# Patient Record
Sex: MALE | Race: WHITE | NOT HISPANIC OR LATINO | URBAN - METROPOLITAN AREA
[De-identification: names, ages, dates, MRNs, and addresses within clinical notes are randomized per-mention and may not be internally consistent; named-entity substitution may affect disease eponyms.]

---

## 2017-03-09 RX ORDER — RANITIDINE 150 MG/1
150 TABLET ORAL 2 TIMES DAILY
Qty: 90 TABLET | Refills: 1 | Status: SHIPPED | OUTPATIENT
Start: 2017-03-09 | End: 2017-05-08 | Stop reason: SDUPTHER

## 2017-03-27 ENCOUNTER — OFFICE VISIT (OUTPATIENT)
Dept: INTERNAL MEDICINE | Facility: CLINIC | Age: 57
End: 2017-03-27

## 2017-03-27 VITALS
TEMPERATURE: 97.7 F | OXYGEN SATURATION: 97 % | HEART RATE: 58 BPM | WEIGHT: 166 LBS | RESPIRATION RATE: 16 BRPM | DIASTOLIC BLOOD PRESSURE: 72 MMHG | BODY MASS INDEX: 24.59 KG/M2 | SYSTOLIC BLOOD PRESSURE: 108 MMHG | HEIGHT: 69 IN

## 2017-03-27 DIAGNOSIS — E78.5 HYPERLIPIDEMIA, UNSPECIFIED HYPERLIPIDEMIA TYPE: Primary | ICD-10-CM

## 2017-03-27 DIAGNOSIS — E03.9 HYPOTHYROIDISM, UNSPECIFIED TYPE: ICD-10-CM

## 2017-03-27 DIAGNOSIS — K21.9 GASTROESOPHAGEAL REFLUX DISEASE WITHOUT ESOPHAGITIS: ICD-10-CM

## 2017-03-27 LAB
CHOLEST SERPL-MCNC: 190 MG/DL (ref 0–200)
HDLC SERPL-MCNC: 105 MG/DL (ref 40–60)
LDLC SERPL CALC-MCNC: 75 MG/DL (ref 0–100)
TRIGL SERPL-MCNC: 51 MG/DL (ref 0–150)
TSH SERPL DL<=0.005 MIU/L-ACNC: 0.33 MIU/ML (ref 0.27–4.2)
VLDLC SERPL CALC-MCNC: 10.2 MG/DL (ref 5–40)

## 2017-03-27 PROCEDURE — 99214 OFFICE O/P EST MOD 30 MIN: CPT | Performed by: INTERNAL MEDICINE

## 2017-03-27 NOTE — PROGRESS NOTES
Subjective   aWqas Odonnell is a 56 y.o. male.     Chief Complaint   Patient presents with   • Hyperlipidemia     follow up GERD   • Hypothyroidism         Hyperlipidemia   This is a chronic problem. The current episode started more than 1 year ago. The problem is controlled. Recent lipid tests were reviewed and are normal. Exacerbating diseases include hypothyroidism. There are no known factors aggravating his hyperlipidemia. Pertinent negatives include no chest pain or shortness of breath. Current antihyperlipidemic treatment includes statins. The current treatment provides significant improvement of lipids. There are no compliance problems.  Risk factors for coronary artery disease include dyslipidemia.   Hypothyroidism   This is a chronic problem. The current episode started more than 1 year ago. The problem occurs constantly. The problem has been unchanged. Associated symptoms include nausea. Pertinent negatives include no abdominal pain, chest pain, chills, fever or vomiting. Nothing aggravates the symptoms. The treatment provided significant relief.   Heartburn   He complains of heartburn and nausea. He reports no abdominal pain, no chest pain or no wheezing. This is a chronic problem. The current episode started more than 1 year ago. The problem occurs occasionally. The problem has been unchanged. The heartburn is of mild intensity. The symptoms are aggravated by certain foods and caffeine. Risk factors include caffeine use. He has tried a histamine-2 antagonist for the symptoms. The treatment provided significant relief.        The following portions of the patient's history were reviewed and updated as appropriate: allergies, current medications, past social history and problem list.    Outpatient Prescriptions Marked as Taking for the 3/27/17 encounter (Office Visit) with Brian Boo MD   Medication Sig Dispense Refill   • aspirin 81 MG EC tablet Take 81 mg by mouth Daily.     • levothyroxine  (SYNTHROID) 175 MCG tablet Take 1 tablet by mouth Daily. 90 tablet 3   • LIVALO 4 MG tablet Take 1 tablet by mouth Daily. 90 tablet 1   • raNITIdine (ZANTAC) 150 MG tablet Take 1 tablet by mouth 2 (Two) Times a Day. 90 tablet 1       Review of Systems   Constitutional: Negative for chills and fever.   Respiratory: Negative for shortness of breath and wheezing.    Cardiovascular: Negative for chest pain and palpitations.   Gastrointestinal: Positive for heartburn and nausea. Negative for abdominal distention, abdominal pain, constipation, diarrhea and vomiting.       Objective   Vitals:    03/27/17 1526   BP: 108/72   Pulse: 58   Resp: 16   Temp: 97.7 °F (36.5 °C)   SpO2: 97%      Last Weight    03/27/17  1526   Weight: 166 lb (75.3 kg)    [unfilled]  Body mass index is 24.51 kg/(m^2).      Physical Exam   Constitutional: He appears well-developed and well-nourished. No distress.   HENT:   Head: Normocephalic and atraumatic.   Neck: Carotid bruit is not present. No thyromegaly present.   Cardiovascular: Normal rate, regular rhythm, normal heart sounds and intact distal pulses.  Exam reveals no gallop.    No murmur heard.  Pulmonary/Chest: Effort normal and breath sounds normal. No respiratory distress. He has no wheezes. He has no rales.   Abdominal: Soft. Bowel sounds are normal. He exhibits no mass. There is no tenderness. There is no guarding.         Problem List Items Addressed This Visit        Cardiovascular and Mediastinum    Hyperlipidemia - Primary    Relevant Orders    Lipid Panel       Digestive    Gastroesophageal reflux disease without esophagitis       Endocrine    Hypothyroidism    Relevant Orders    TSH        Assessment/Plan   In for recheck of hyperlipidemia, GERD, and hypothyroidism.  His GERD is under good control.  Thyroid is under good control.  Lipids under treatment with a statin.  He gets a lipid profile every 6 months.  Annual lab work and annual exam September 2017.  Recheck TSH today  since we cut back on his thyroid last visit.         Pedro disclaimer:   Much of this encounter note is an electronic transcription/translation of spoken language to printed text. The electronic translation of spoken language may permit erroneous, or at times, nonsensical words or phrases to be inadvertently transcribed; Although I have reviewed the note for such errors, some may still exist.

## 2017-05-08 RX ORDER — RANITIDINE 150 MG/1
150 TABLET ORAL 2 TIMES DAILY
Qty: 90 TABLET | Refills: 1 | Status: SHIPPED | OUTPATIENT
Start: 2017-05-08 | End: 2017-08-16 | Stop reason: SDUPTHER

## 2017-05-22 RX ORDER — RANITIDINE 150 MG/1
TABLET ORAL
Qty: 90 TABLET | Refills: 0 | Status: SHIPPED | OUTPATIENT
Start: 2017-05-22 | End: 2018-04-03 | Stop reason: SDUPTHER

## 2017-07-19 RX ORDER — PITAVASTATIN CALCIUM 4.18 MG/1
TABLET, FILM COATED ORAL
Qty: 90 TABLET | Refills: 1 | Status: SHIPPED | OUTPATIENT
Start: 2017-07-19 | End: 2017-10-03

## 2017-08-16 RX ORDER — RANITIDINE 150 MG/1
TABLET ORAL
Qty: 90 TABLET | Refills: 1 | Status: SHIPPED | OUTPATIENT
Start: 2017-08-16 | End: 2017-10-03

## 2017-09-27 RX ORDER — LEVOTHYROXINE SODIUM 175 MCG
TABLET ORAL
Qty: 90 TABLET | Refills: 0 | Status: SHIPPED | OUTPATIENT
Start: 2017-09-27 | End: 2017-12-26 | Stop reason: SDUPTHER

## 2017-10-03 ENCOUNTER — OFFICE VISIT (OUTPATIENT)
Dept: INTERNAL MEDICINE | Facility: CLINIC | Age: 57
End: 2017-10-03

## 2017-10-03 VITALS
HEIGHT: 69 IN | HEART RATE: 96 BPM | BODY MASS INDEX: 25.33 KG/M2 | WEIGHT: 171 LBS | TEMPERATURE: 98.5 F | OXYGEN SATURATION: 99 % | SYSTOLIC BLOOD PRESSURE: 132 MMHG | DIASTOLIC BLOOD PRESSURE: 72 MMHG | RESPIRATION RATE: 16 BRPM

## 2017-10-03 DIAGNOSIS — Z12.5 SCREENING FOR PROSTATE CANCER: ICD-10-CM

## 2017-10-03 DIAGNOSIS — Z00.00 ENCOUNTER FOR ANNUAL HEALTH EXAMINATION: Primary | ICD-10-CM

## 2017-10-03 DIAGNOSIS — E78.5 HYPERLIPIDEMIA, UNSPECIFIED HYPERLIPIDEMIA TYPE: ICD-10-CM

## 2017-10-03 DIAGNOSIS — E03.9 HYPOTHYROIDISM, UNSPECIFIED TYPE: ICD-10-CM

## 2017-10-03 LAB
ALBUMIN SERPL-MCNC: 5 G/DL (ref 3.5–5.2)
ALBUMIN/GLOB SERPL: 2.5 G/DL
ALP SERPL-CCNC: 50 U/L (ref 39–117)
ALT SERPL-CCNC: 18 U/L (ref 1–41)
AST SERPL-CCNC: 19 U/L (ref 1–40)
BASOPHILS # BLD AUTO: 0.02 10*3/MM3 (ref 0–0.2)
BASOPHILS NFR BLD AUTO: 0.3 % (ref 0–1.5)
BILIRUB BLD-MCNC: NEGATIVE MG/DL
BILIRUB SERPL-MCNC: 1 MG/DL (ref 0.1–1.2)
BUN SERPL-MCNC: 16 MG/DL (ref 6–20)
BUN/CREAT SERPL: 17.6 (ref 7–25)
CALCIUM SERPL-MCNC: 9.8 MG/DL (ref 8.6–10.5)
CHLORIDE SERPL-SCNC: 101 MMOL/L (ref 98–107)
CHOLEST SERPL-MCNC: 206 MG/DL (ref 0–200)
CLARITY, POC: CLEAR
CO2 SERPL-SCNC: 23.5 MMOL/L (ref 22–29)
COLOR UR: YELLOW
CREAT SERPL-MCNC: 0.91 MG/DL (ref 0.76–1.27)
EOSINOPHIL # BLD AUTO: 0.22 10*3/MM3 (ref 0–0.7)
EOSINOPHIL NFR BLD AUTO: 3.3 % (ref 0.3–6.2)
ERYTHROCYTE [DISTWIDTH] IN BLOOD BY AUTOMATED COUNT: 12.9 % (ref 11.5–14.5)
GLOBULIN SER CALC-MCNC: 2 GM/DL
GLUCOSE SERPL-MCNC: 96 MG/DL (ref 65–99)
GLUCOSE UR STRIP-MCNC: NEGATIVE MG/DL
HCT VFR BLD AUTO: 44.2 % (ref 40.4–52.2)
HDLC SERPL-MCNC: 104 MG/DL (ref 40–60)
HGB BLD-MCNC: 15 G/DL (ref 13.7–17.6)
IMM GRANULOCYTES # BLD: 0 10*3/MM3 (ref 0–0.03)
IMM GRANULOCYTES NFR BLD: 0 % (ref 0–0.5)
KETONES UR QL: NEGATIVE
LDLC SERPL CALC-MCNC: 90 MG/DL (ref 0–100)
LEUKOCYTE EST, POC: NEGATIVE
LYMPHOCYTES # BLD AUTO: 2.38 10*3/MM3 (ref 0.9–4.8)
LYMPHOCYTES NFR BLD AUTO: 35.7 % (ref 19.6–45.3)
MCH RBC QN AUTO: 32.1 PG (ref 27–32.7)
MCHC RBC AUTO-ENTMCNC: 33.9 G/DL (ref 32.6–36.4)
MCV RBC AUTO: 94.4 FL (ref 79.8–96.2)
MONOCYTES # BLD AUTO: 0.7 10*3/MM3 (ref 0.2–1.2)
MONOCYTES NFR BLD AUTO: 10.5 % (ref 5–12)
NEUTROPHILS # BLD AUTO: 3.34 10*3/MM3 (ref 1.9–8.1)
NEUTROPHILS NFR BLD AUTO: 50.2 % (ref 42.7–76)
NITRITE UR-MCNC: NEGATIVE MG/ML
PH UR: 5.5 [PH] (ref 5–8)
PLATELET # BLD AUTO: 218 10*3/MM3 (ref 140–500)
POTASSIUM SERPL-SCNC: 4.2 MMOL/L (ref 3.5–5.2)
PROT SERPL-MCNC: 7 G/DL (ref 6–8.5)
PROT UR STRIP-MCNC: NEGATIVE MG/DL
PSA SERPL-MCNC: 0.36 NG/ML (ref 0–4)
RBC # BLD AUTO: 4.68 10*6/MM3 (ref 4.6–6)
RBC # UR STRIP: NEGATIVE /UL
SODIUM SERPL-SCNC: 139 MMOL/L (ref 136–145)
SP GR UR: 1.01 (ref 1–1.03)
TRIGL SERPL-MCNC: 60 MG/DL (ref 0–150)
TSH SERPL DL<=0.005 MIU/L-ACNC: 1.37 MIU/ML (ref 0.27–4.2)
UROBILINOGEN UR QL: NORMAL
VLDLC SERPL CALC-MCNC: 12 MG/DL (ref 5–40)
WBC # BLD AUTO: 6.66 10*3/MM3 (ref 4.5–10.7)

## 2017-10-03 PROCEDURE — 99396 PREV VISIT EST AGE 40-64: CPT | Performed by: INTERNAL MEDICINE

## 2017-10-03 PROCEDURE — 81003 URINALYSIS AUTO W/O SCOPE: CPT | Performed by: INTERNAL MEDICINE

## 2017-10-03 NOTE — PROGRESS NOTES
Subjective   Waqas Odonnell is a 57 y.o. male.     Chief Complaint   Patient presents with   • Annual Exam         HPI Comments: In for annual preventative exam.  He gets about 4-5 hours of sleep at night.  Feels rested.  Energy is okay.  Is been high strung.  No significant exercise other than the being very active at work.  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  Diet is balanced.  Works in the [employer].       The following portions of the patient's history were reviewed and updated as appropriate: allergies, current medications, past social history and problem list.    HISTORY  Outpatient Prescriptions Marked as Taking for the 10/3/17 encounter (Office Visit) with Brian Boo MD   Medication Sig Dispense Refill   • aspirin 81 MG EC tablet Take 81 mg by mouth Daily.     • LIVALO 4 MG tablet Take 1 tablet by mouth Daily. 90 tablet 1   • raNITIdine (ZANTAC) 150 MG tablet TAKE 1 TABLET BY MOUTH 2 (TWO) TIMES A DAY. 90 tablet 0   • SYNTHROID 175 MCG tablet TAKE 1 TABLET DAILY 90 tablet 0     Social History     Social History   • Marital status:      Spouse name: N/A   • Number of children: N/A   • Years of education: N/A     Occupational History   • Not on file.     Social History Main Topics   • Smoking status: Former Smoker     Quit date: 1991   • Smokeless tobacco: Not on file   • Alcohol use Yes      Comment: Beer: Weekends   • Drug use: Not on file   • Sexual activity: Not on file     Other Topics Concern   • Not on file     Social History Narrative     Family History   Problem Relation Age of Onset   • Diabetes Mother    • Heart attack Father    • No Known Problems Sister    • No Known Problems Brother    • No Known Problems Daughter    • No Known Problems Son    • No Known Problems Brother      No past medical history on file.  No past surgical history on file.    Review of Systems   Constitutional: Negative for chills, fatigue and fever.   HENT: Negative for  congestion, ear pain, nosebleeds, rhinorrhea, sore throat and voice change.    Eyes: Negative for discharge, itching and visual disturbance.   Respiratory: Negative for cough, chest tightness, shortness of breath and wheezing.    Cardiovascular: Negative for chest pain, palpitations and leg swelling.   Gastrointestinal: Positive for abdominal pain (heartburn controlled with Zantac) and blood in stool (occasional few drops of blood rectum after bowel movement). Negative for anal bleeding, constipation, diarrhea, nausea and vomiting.   Endocrine: Negative for cold intolerance, heat intolerance and polyuria.   Genitourinary: Negative for difficulty urinating, dysuria, frequency, hematuria and urgency.   Musculoskeletal: Negative for arthralgias, back pain and myalgias.   Skin: Negative for rash and wound.   Allergic/Immunologic: Negative for environmental allergies.   Neurological: Negative for dizziness, syncope, weakness, light-headedness and headaches.   Hematological: Negative for adenopathy. Does not bruise/bleed easily.   Psychiatric/Behavioral: Negative for confusion and sleep disturbance. The patient is not nervous/anxious.        Objective   Vitals:    10/03/17 1442   BP: 132/72   Pulse: 96   Resp: 16   Temp: 98.5 °F (36.9 °C)   SpO2: 99%      Last Weight    10/03/17  1442   Weight: 171 lb (77.6 kg)    [unfilled]  Body mass index is 25.25 kg/(m^2).      Physical Exam   Constitutional: He is oriented to person, place, and time. He appears well-developed and well-nourished.   HENT:   Head: Normocephalic and atraumatic.   Right Ear: External ear normal.   Left Ear: External ear normal.   Nose: Nose normal.   Mouth/Throat: Oropharynx is clear and moist.   Eyes: Conjunctivae and EOM are normal. Pupils are equal, round, and reactive to light. No scleral icterus.   Neck: Normal range of motion. Neck supple. No JVD present. No thyromegaly present.   Cardiovascular: Normal rate, regular rhythm, normal heart sounds  and intact distal pulses.  Exam reveals no gallop and no friction rub.    No murmur heard.  Pulmonary/Chest: Effort normal and breath sounds normal. No respiratory distress. He has no wheezes. He has no rales.   Abdominal: Soft. Bowel sounds are normal. He exhibits no distension and no mass. There is no tenderness. There is no rebound and no guarding. No hernia.   Genitourinary: Rectum normal, prostate normal and penis normal.   Musculoskeletal: Normal range of motion. He exhibits no edema.   Lymphadenopathy:     He has no cervical adenopathy.   Neurological: He is alert and oriented to person, place, and time. He has normal reflexes. He displays normal reflexes.   Skin: Skin is warm and dry.   Psychiatric: He has a normal mood and affect. His behavior is normal. Judgment and thought content normal.   Nursing note and vitals reviewed.        Problem List Items Addressed This Visit     None      Visit Diagnoses     Encounter for annual health examination    -  Primary    Relevant Orders    POCT urinalysis dipstick, automated (Completed)        Assessment/Plan   In for annual preventative exam.  Overall health is been excellent.  He has hypothyroidism on replacement.  Hyperlipidemia.  GERD controlled on ranitidine.  Last TSH was a little low so we will back off on the Synthroid from 200 to 175 MCG daily.  We'll plan on checking lipids every 6 months.  Annual lab today October 2017.  Colonoscopy up-to-date at age 50.  Flu shots at work.  We'll monitor lipids in 6 month intervals.         Pedro disclaimer:   Much of this encounter note is an electronic transcription/translation of spoken language to printed text. The electronic translation of spoken language may permit erroneous, or at times, nonsensical words or phrases to be inadvertently transcribed; Although I have reviewed the note for such errors, some may still exist.

## 2017-11-21 RX ORDER — RANITIDINE 150 MG/1
TABLET ORAL
Qty: 90 TABLET | Refills: 1 | Status: SHIPPED | OUTPATIENT
Start: 2017-11-21 | End: 2018-04-03 | Stop reason: SDUPTHER

## 2017-12-27 RX ORDER — LEVOTHYROXINE SODIUM 175 MCG
TABLET ORAL
Qty: 90 TABLET | Refills: 0 | Status: SHIPPED | OUTPATIENT
Start: 2017-12-27 | End: 2018-06-21 | Stop reason: SDUPTHER

## 2018-02-27 RX ORDER — RANITIDINE 150 MG/1
TABLET ORAL
Qty: 90 TABLET | Refills: 1 | Status: SHIPPED | OUTPATIENT
Start: 2018-02-27 | End: 2018-04-03 | Stop reason: SDUPTHER

## 2018-02-27 RX ORDER — PITAVASTATIN CALCIUM 4.18 MG/1
TABLET, FILM COATED ORAL
Qty: 90 TABLET | Refills: 1 | Status: SHIPPED | OUTPATIENT
Start: 2018-02-27 | End: 2018-04-03 | Stop reason: SDUPTHER

## 2018-03-23 RX ORDER — LEVOTHYROXINE SODIUM 175 MCG
TABLET ORAL
Qty: 90 TABLET | Refills: 0 | Status: SHIPPED | OUTPATIENT
Start: 2018-03-23 | End: 2018-04-03 | Stop reason: SDUPTHER

## 2018-04-03 ENCOUNTER — OFFICE VISIT (OUTPATIENT)
Dept: INTERNAL MEDICINE | Facility: CLINIC | Age: 58
End: 2018-04-03

## 2018-04-03 VITALS
SYSTOLIC BLOOD PRESSURE: 118 MMHG | TEMPERATURE: 98 F | RESPIRATION RATE: 16 BRPM | DIASTOLIC BLOOD PRESSURE: 64 MMHG | OXYGEN SATURATION: 96 % | HEART RATE: 60 BPM | HEIGHT: 69 IN | BODY MASS INDEX: 26.33 KG/M2 | WEIGHT: 177.8 LBS

## 2018-04-03 DIAGNOSIS — E78.5 HYPERLIPIDEMIA, UNSPECIFIED HYPERLIPIDEMIA TYPE: Primary | ICD-10-CM

## 2018-04-03 DIAGNOSIS — K21.9 GASTROESOPHAGEAL REFLUX DISEASE WITHOUT ESOPHAGITIS: ICD-10-CM

## 2018-04-03 DIAGNOSIS — Z23 NEED FOR VACCINATION: ICD-10-CM

## 2018-04-03 DIAGNOSIS — E03.9 HYPOTHYROIDISM, UNSPECIFIED TYPE: ICD-10-CM

## 2018-04-03 LAB
CHOLEST SERPL-MCNC: 229 MG/DL (ref 0–200)
HDLC SERPL-MCNC: 94 MG/DL (ref 40–60)
LDLC SERPL CALC-MCNC: 121 MG/DL (ref 0–100)
TRIGL SERPL-MCNC: 68 MG/DL (ref 0–150)
VLDLC SERPL CALC-MCNC: 13.6 MG/DL (ref 5–40)

## 2018-04-03 PROCEDURE — 99213 OFFICE O/P EST LOW 20 MIN: CPT | Performed by: INTERNAL MEDICINE

## 2018-04-03 PROCEDURE — 90715 TDAP VACCINE 7 YRS/> IM: CPT | Performed by: INTERNAL MEDICINE

## 2018-04-03 PROCEDURE — 90471 IMMUNIZATION ADMIN: CPT | Performed by: INTERNAL MEDICINE

## 2018-04-03 RX ORDER — RANITIDINE 150 MG/1
150 TABLET ORAL 2 TIMES DAILY
Qty: 180 TABLET | Refills: 3 | Status: SHIPPED | OUTPATIENT
Start: 2018-04-03 | End: 2018-10-04

## 2018-04-03 NOTE — PATIENT INSTRUCTIONS
Exercising to Stay Healthy  Exercising regularly is important. It has many health benefits, such as:  · Improving your overall fitness, flexibility, and endurance.  · Increasing your bone density.  · Helping with weight control.  · Decreasing your body fat.  · Increasing your muscle strength.  · Reducing stress and tension.  · Improving your overall health.  In order to become healthy and stay healthy, it is recommended that you do moderate-intensity and vigorous-intensity exercise. You can tell that you are exercising at a moderate intensity if you have a higher heart rate and faster breathing, but you are still able to hold a conversation. You can tell that you are exercising at a vigorous intensity if you are breathing much harder and faster and cannot hold a conversation while exercising.  How often should I exercise?  Choose an activity that you enjoy and set realistic goals. Your health care provider can help you to make an activity plan that works for you. Exercise regularly as directed by your health care provider. This may include:  · Doing resistance training twice each week, such as:  ¨ Push-ups.  ¨ Sit-ups.  ¨ Lifting weights.  ¨ Using resistance bands.  · Doing a given intensity of exercise for a given amount of time. Choose from these options:  ¨ 150 minutes of moderate-intensity exercise every week.  ¨ 75 minutes of vigorous-intensity exercise every week.  ¨ A mix of moderate-intensity and vigorous-intensity exercise every week.  Children, pregnant women, people who are out of shape, people who are overweight, and older adults may need to consult a health care provider for individual recommendations. If you have any sort of medical condition, be sure to consult your health care provider before starting a new exercise program.  What are some exercise ideas?  Some moderate-intensity exercise ideas include:  · Walking at a rate of 1 mile in 15 minutes.  · Biking.  · Hiking.  · Golfing.  · Dancing.  Some  vigorous-intensity exercise ideas include:  · Walking at a rate of at least 4.5 miles per hour.  · Jogging or running at a rate of 5 miles per hour.  · Biking at a rate of at least 10 miles per hour.  · Lap swimming.  · Roller-skating or in-line skating.  · Cross-country skiing.  · Vigorous competitive sports, such as football, basketball, and soccer.  · Jumping rope.  · Aerobic dancing.  What are some everyday activities that can help me to get exercise?  · Yard work, such as:  ¨ Pushing a .  ¨ Raking and bagging leaves.  · Washing and waxing your car.  · Pushing a stroller.  · Shoveling snow.  · Gardening.  · Washing windows or floors.  How can I be more active in my day-to-day activities?  · Use the stairs instead of the elevator.  · Take a walk during your lunch break.  · If you drive, park your car farther away from work or school.  · If you take public transportation, get off one stop early and walk the rest of the way.  · Make all of your phone calls while standing up and walking around.  · Get up, stretch, and walk around every 30 minutes throughout the day.  What guidelines should I follow while exercising?  · Do not exercise so much that you hurt yourself, feel dizzy, or get very short of breath.  · Consult your health care provider before starting a new exercise program.  · Wear comfortable clothes and shoes with good support.  · Drink plenty of water while you exercise to prevent dehydration or heat stroke. Body water is lost during exercise and must be replaced.  · Work out until you breathe faster and your heart beats faster.  This information is not intended to replace advice given to you by your health care provider. Make sure you discuss any questions you have with your health care provider.  Document Released: 01/20/2012 Document Revised: 05/25/2017 Document Reviewed: 05/21/2015  [employer] Interactive Patient Education © 2017 [employer]  Serving Sizes  A serving size is a measured  amount of food or drink, such as one slice of bread, that has an associated nutrient content. Knowing the serving size of a food or drink can help you determine how much of that food you should consume.  What is the size of one serving?  The size of one healthy serving depends on the food or drink. To determine a serving size, read the food label. If the food or drink does not have a food label, try to find serving size information online. Or, use the following to estimate the size of one adult serving:  Grain   1 slice bread. ½ bagel. ½ cup pasta.  Vegetable   ½ cup cooked or canned vegetables. 1 cup raw, leafy greens.  Fruit   ½ cup canned fruit. 1 medium fruit. ¼ cup dried fruit.  Meat and Other Protein Sources   1 oz meat, poultry, or fish. ¼ cup cooked beans. 1 egg. ¼ cup nuts or seeds. 1 Tbsp nut butter. ¼ cup tofu or tempeh. 2 Tbsp hummus.  Dairy   An individual container of yogurt (6-8 oz). 1 piece of cheese the size of your thumb (1 oz). 1 cup (8 oz) milk or milk alternative.  Fat   A piece the size of one dice. 1 tsp soft margarine. 1 Tbsp mayonnaise. 1 tsp vegetable oil. 1 Tbsp regular salad dressing. 2 Tbsp low-fat salad dressing.  How many servings should I eat from each food group each day?  The following are the suggested number of servings to try and have every day from each food group. You can also look at your eating throughout the week and aim for meeting these requirements on most days for overall healthy eating.  Grain   6-8 servings. Try to have half of your grains from whole grains, such as whole wheat bread, corn tortillas, oatmeal, brown rice, whole wheat pasta, and bulgur.  Vegetable   At least 2½-3 servings.  Fruit   2 servings.  Meat and Other Protein Foods   5-6 servings. Aim to have lean proteins, such as chicken, turkey, fish, beans, or tofu.  Dairy   3 servings. Choose low-fat or nonfat if you are trying to control your weight.  Fat   2-3 servings.  Is a serving the same thing as a  portion?  No. A portion is the actual amount you eat, which may be more than one serving. Knowing the specific serving size of a food and the nutritional information that goes with it can help you make a healthy decision on what size portion to eat.  What are some tips to help me learn healthy serving sizes?  · Check food labels for serving sizes. Many foods that come as a single portion actually contain multiple servings.  · Determine the serving size of foods you commonly eat and figure out how large a portion you usually eat.  · Measure the number of servings that can be held by the bowls, glasses, cups, and plates you typically use. For example, pour your breakfast cereal into your regular bowl and then pour it into a measuring cup.  · For 1-2 days, measure the serving sizes of all the foods you eat.  · Practice estimating serving sizes and determining how big your portions should be.  This information is not intended to replace advice given to you by your health care provider. Make sure you discuss any questions you have with your health care provider.  Document Released: 09/15/2004 Document Revised: 08/12/2017 Document Reviewed: 03/17/2015  [employer] Interactive Patient Education © 2017 [employer]  BMI for Adults  Body mass index (BMI) is a number that is calculated from a person's weight and height. In most adults, the number is used to find how much of an adult's weight is made up of fat. BMI is not as accurate as a direct measure of body fat.  How is BMI calculated?  BMI is calculated by dividing weight in kilograms by height in meters squared. It can also be calculated by dividing weight in pounds by height in inches squared, then multiplying the resulting number by 703. Charts are available to help you find your BMI quickly and easily without doing this calculation.  How is BMI interpreted?  Health care professionals use BMI charts to identify whether an adult is underweight, at a normal weight, or  overweight based on the following guidelines:  · Underweight: BMI less than 18.5.  · Normal weight: BMI between 18.5 and 24.9.  · Overweight: BMI between 25 and 29.9.  · Obese: BMI of 30 and above.  BMI is usually interpreted the same for males and females.  Weight includes both fat and muscle, so someone with a muscular build, such as an athlete, may have a BMI that is higher than 24.9. In cases like these, BMI may not accurately depict body fat. To determine if excess body fat is the cause of a BMI of 25 or higher, further assessments may need to be done by a health care provider.  Why is BMI a useful tool?  BMI is used to identify a possible weight problem that may be related to a medical problem or may increase the risk for medical problems. BMI can also be used to promote changes to reach a healthy weight.  This information is not intended to replace advice given to you by your health care provider. Make sure you discuss any questions you have with your health care provider.  Document Released: 2005 Document Revised: 2017 Document Reviewed: 05/15/2015  [employer] Interactive Patient Education © 2017 [employer]  Tdap Vaccine (Tetanus, Diphtheria and Pertussis): What You Need to Know  1. Why get vaccinated?  Tetanus, diphtheria and pertussis are very serious diseases. Tdap vaccine can protect us from these diseases. And, Tdap vaccine given to pregnant women can protect  babies against pertussis.  TETANUS (Lockjaw) is rare in the [place] today. It causes painful muscle tightening and stiffness, usually all over the body.  · It can lead to tightening of muscles in the head and neck so you can't open your mouth, swallow, or sometimes even breathe. Tetanus kills about 1 out of 10 people who are infected even after receiving the best medical care.  DIPHTHERIA is also rare in the [place] today. It can cause a thick coating to form in the back of the throat.  · It can lead to breathing  problems, heart failure, paralysis, and death.  PERTUSSIS (Whooping Cough) causes severe coughing spells, which can cause difficulty breathing, vomiting and disturbed sleep.  · It can also lead to weight loss, incontinence, and rib fractures. Up to 2 in 100 adolescents and 5 in 100 adults with pertussis are hospitalized or have complications, which could include pneumonia or death.  These diseases are caused by bacteria. Diphtheria and pertussis are spread from person to person through secretions from coughing or sneezing. Tetanus enters the body through cuts, scratches, or wounds.  Before vaccines, as many as 200,000 cases of diphtheria, 200,000 cases of pertussis, and hundreds of cases of tetanus, were reported in the [place] each year. Since vaccination began, reports of cases for tetanus and diphtheria have dropped by about 99% and for pertussis by about 80%.  2. Tdap vaccine  Tdap vaccine can protect adolescents and adults from tetanus, diphtheria, and pertussis. One dose of Tdap is routinely given at age 11 or 12. People who did not get Tdap at that age should get it as soon as possible.  Tdap is especially important for healthcare professionals and anyone having close contact with a baby younger than 12 months.  Pregnant women should get a dose of Tdap during every pregnancy, to protect the  from pertussis. Infants are most at risk for severe, life-threatening complications from pertussis.  Another vaccine, called Td, protects against tetanus and diphtheria, but not pertussis. A Td booster should be given every 10 years. Tdap may be given as one of these boosters if you have never gotten Tdap before. Tdap may also be given after a severe cut or burn to prevent tetanus infection.  Your doctor or the person giving you the vaccine can give you more information.  Tdap may safely be given at the same time as other vaccines.  3. Some people should not get this vaccine  · A person who has ever had a  life-threatening allergic reaction after a previous dose of any diphtheria, tetanus or pertussis containing vaccine, OR has a severe allergy to any part of this vaccine, should not get Tdap vaccine. Tell the person giving the vaccine about any severe allergies.  · Anyone who had coma or long repeated seizures within 7 days after a childhood dose of DTP or DTaP, or a previous dose of Tdap, should not get Tdap, unless a cause other than the vaccine was found. They can still get Td.  · Talk to your doctor if you:  ¨ have seizures or another nervous system problem,  ¨ had severe pain or swelling after any vaccine containing diphtheria, tetanus or pertussis,  ¨ ever had a condition called Guillain-Barré Syndrome (GBS),  ¨ aren't feeling well on the day the shot is scheduled.  4. Risks  With any medicine, including vaccines, there is a chance of side effects. These are usually mild and go away on their own. Serious reactions are also possible but are rare.  Most people who get Tdap vaccine do not have any problems with it.  Mild problems following Tdap:   (Did not interfere with activities)  · Pain where the shot was given (about 3 in 4 adolescents or 2 in 3 adults)  · Redness or swelling where the shot was given (about 1 person in 5)  · Mild fever of at least 100.4°F (up to about 1 in 25 adolescents or 1 in 100 adults)  · Headache (about 3 or 4 people in 10)  · Tiredness (about 1 person in 3 or 4)  · Nausea, vomiting, diarrhea, stomach ache (up to 1 in 4 adolescents or 1 in 10 adults)  · Chills, sore joints (about 1 person in 10)  · Body aches (about 1 person in 3 or 4)  · Rash, swollen glands (uncommon)  Moderate problems following Tdap:   (Interfered with activities, but did not require medical attention)  · Pain where the shot was given (up to 1 in 5 or 6)  · Redness or swelling where the shot was given (up to about 1 in 16 adolescents or 1 in 12 adults)  · Fever over 102°F (about 1 in 100 adolescents or 1 in 250  adults)  · Headache (about 1 in 7 adolescents or 1 in 10 adults)  · Nausea, vomiting, diarrhea, stomach ache (up to 1 or 3 people in 100)  · Swelling of the entire arm where the shot was given (up to about 1 in 500).  Severe problems following Tdap:   (Unable to perform usual activities; required medical attention)  · Swelling, severe pain, bleeding and redness in the arm where the shot was given (rare).  Problems that could happen after any vaccine:   · People sometimes faint after a medical procedure, including vaccination. Sitting or lying down for about 15 minutes can help prevent fainting, and injuries caused by a fall. Tell your doctor if you feel dizzy, or have vision changes or ringing in the ears.  · Some people get severe pain in the shoulder and have difficulty moving the arm where a shot was given. This happens very rarely.  · Any medication can cause a severe allergic reaction. Such reactions from a vaccine are very rare, estimated at fewer than 1 in a million doses, and would happen within a few minutes to a few hours after the vaccination.  As with any medicine, there is a very remote chance of a vaccine causing a serious injury or death.  The safety of vaccines is always being monitored. For more information, visit: www.cdc.gov/vaccinesafety/  5. What if there is a serious problem?  What should I look for?   Look for anything that concerns you, such as signs of a severe allergic reaction, very high fever, or unusual behavior.  Signs of a severe allergic reaction can include hives, swelling of the face and throat, difficulty breathing, a fast heartbeat, dizziness, and weakness. These would usually start a few minutes to a few hours after the vaccination.  What should I do?   · If you think it is a severe allergic reaction or other emergency that can’t wait, call 9-1-1 or get the person to the nearest hospital. Otherwise, call your doctor.  · Afterward, the reaction should be reported to the Vaccine  Adverse Event Reporting System (VAERS). Your doctor might file this report, or you can do it yourself through the VAERS web site at www.vaers.hhs.gov, or by calling 1-173.884.8049.  ¨ VAERS does not give medical advice.  6. The National Vaccine Injury Compensation Program  The National Vaccine Injury Compensation Program (VICP) is a federal program that was created to compensate people who may have been injured by certain vaccines.  Persons who believe they may have been injured by a vaccine can learn about the program and about filing a claim by calling 1-760.650.9983 or visiting the VICP website at www.[facility].gov/vaccinecompensation. There is a time limit to file a claim for compensation.  7. How can I learn more?  · Ask your doctor. He or she can give you the vaccine package insert or suggest other sources of information.  · Call your local or state health department.  · Contact the Centers for Disease Control and Prevention (CDC):  ¨ Call 1-824.556.8863 (0-111-PHZ-INFO) or  ¨ Visit CDC’s website at www.cdc.gov/vaccines  CDC Tdap Vaccine VIS (2/24/15)  This information is not intended to replace advice given to you by your health care provider. Make sure you discuss any questions you have with your health care provider.  Document Released: 06/18/2013 Document Revised: 09/07/2017 Document Reviewed: 09/07/2017  [employer] Interactive Patient Education © 2017 [employer]

## 2018-04-03 NOTE — PROGRESS NOTES
Subjective   Waqas Odonnell is a 57 y.o. male.     Chief Complaint   Patient presents with   • Hyperlipidemia   • Hypothyroidism         Hyperlipidemia   This is a chronic problem. The current episode started more than 1 year ago. The problem is controlled. Recent lipid tests were reviewed and are normal. Exacerbating diseases include hypothyroidism. There are no known factors aggravating his hyperlipidemia. Pertinent negatives include no chest pain or shortness of breath. Current antihyperlipidemic treatment includes statins. The current treatment provides significant improvement of lipids. There are no compliance problems.  Risk factors for coronary artery disease include dyslipidemia.   Hypothyroidism   This is a chronic problem. The current episode started more than 1 year ago. The problem occurs constantly. The problem has been unchanged. Associated symptoms include nausea. Pertinent negatives include no abdominal pain, chest pain, chills, fever or vomiting. Nothing aggravates the symptoms. The treatment provided significant relief.   Heartburn   He complains of heartburn and nausea. He reports no abdominal pain, no chest pain or no wheezing. This is a chronic problem. The current episode started more than 1 year ago. The problem occurs occasionally. The problem has been unchanged. The heartburn is of mild intensity. The symptoms are aggravated by certain foods and caffeine. Risk factors include caffeine use. He has tried a histamine-2 antagonist for the symptoms. The treatment provided significant relief.        The following portions of the patient's history were reviewed and updated as appropriate: allergies, current medications, past social history and problem list.    Outpatient Prescriptions Marked as Taking for the 4/3/18 encounter (Office Visit) with Brian Boo MD   Medication Sig Dispense Refill   • aspirin 81 MG EC tablet Take 81 mg by mouth Daily.     • LIVALO 4 MG tablet Take 1 tablet by mouth  Daily. 90 tablet 1   • raNITIdine (ZANTAC) 150 MG tablet Take 1 tablet by mouth 2 (Two) Times a Day. 180 tablet 3   • SYNTHROID 175 MCG tablet TAKE 1 TABLET DAILY 90 tablet 0   • [DISCONTINUED] raNITIdine (ZANTAC) 150 MG tablet TAKE 1 TABLET BY MOUTH 2 (TWO) TIMES A DAY. 90 tablet 0   • [DISCONTINUED] raNITIdine (ZANTAC) 150 MG tablet TAKE 1 TABLET TWICE A DAY 90 tablet 1   • [DISCONTINUED] SYNTHROID 175 MCG tablet TAKE 1 TABLET DAILY 90 tablet 0       Review of Systems   Constitutional: Negative for chills and fever.   Respiratory: Negative for shortness of breath and wheezing.    Cardiovascular: Negative for chest pain and palpitations.   Gastrointestinal: Positive for heartburn and nausea. Negative for abdominal distention, abdominal pain, constipation, diarrhea and vomiting.       Objective   Vitals:    04/03/18 1433   BP: 118/64   Pulse: 60   Resp: 16   Temp: 98 °F (36.7 °C)   SpO2: 96%      1    04/03/18  1433   Weight: 80.6 kg (177 lb 12.8 oz)    [unfilled]  Body mass index is 26.24 kg/m².      Physical Exam   Constitutional: He appears well-developed and well-nourished. No distress.   HENT:   Head: Normocephalic and atraumatic.   Neck: Carotid bruit is not present. No thyromegaly present.   Cardiovascular: Normal rate, regular rhythm, normal heart sounds and intact distal pulses.  Exam reveals no gallop.    No murmur heard.  Pulmonary/Chest: Effort normal and breath sounds normal. No respiratory distress. He has no wheezes. He has no rales.   Abdominal: Soft. Bowel sounds are normal. He exhibits no mass. There is no tenderness. There is no guarding.         Problem List Items Addressed This Visit        Cardiovascular and Mediastinum    Hyperlipidemia - Primary       Digestive    Gastroesophageal reflux disease without esophagitis    Relevant Medications    raNITIdine (ZANTAC) 150 MG tablet       Endocrine    Hypothyroidism      Other Visit Diagnoses    None.       Assessment/Plan   In for recheck of  hyperlipidemia, GERD, and hypothyroidism.  His GERD is worse.  Requires Zantac twice daily and runs out early.  We'll boost dose to twice a day routinely.  Thyroid is under good control.  Lipids under treatment with a statin.  He gets a lipid profile every 6 months.  Annual lab work and annual exam September 2018.  Will update TD AP today.  Needs to avoid aspirin and NSAID use.         Dragon disclaimer:   Much of this encounter note is an electronic transcription/translation of spoken language to printed text. The electronic translation of spoken language may permit erroneous, or at times, nonsensical words or phrases to be inadvertently transcribed; Although I have reviewed the note for such errors, some may still exist.

## 2018-06-18 RX ORDER — LEVOTHYROXINE SODIUM 175 MCG
TABLET ORAL
Qty: 90 TABLET | Refills: 0 | Status: SHIPPED | OUTPATIENT
Start: 2018-06-18 | End: 2018-07-15 | Stop reason: SDUPTHER

## 2018-06-19 ENCOUNTER — TELEPHONE (OUTPATIENT)
Dept: INTERNAL MEDICINE | Facility: CLINIC | Age: 58
End: 2018-06-19

## 2018-06-19 NOTE — TELEPHONE ENCOUNTER
The patient is having (R) hip/leg pain. He is coming in for an appointment on 6/26, but wanted to know if Aleve or ibuprofen would be best to take in the mean time. Please advise.    With his history of GERD I would recommend the ibuprofen.

## 2018-06-20 NOTE — TELEPHONE ENCOUNTER
Informed patient. He is taking 800 mg q 8 hours with no relief. Patient has moved appt up to 6/21/18.

## 2018-06-21 ENCOUNTER — OFFICE VISIT (OUTPATIENT)
Dept: INTERNAL MEDICINE | Facility: CLINIC | Age: 58
End: 2018-06-21

## 2018-06-21 VITALS
TEMPERATURE: 98.1 F | DIASTOLIC BLOOD PRESSURE: 82 MMHG | HEART RATE: 58 BPM | WEIGHT: 177.2 LBS | BODY MASS INDEX: 26.25 KG/M2 | RESPIRATION RATE: 16 BRPM | OXYGEN SATURATION: 98 % | SYSTOLIC BLOOD PRESSURE: 138 MMHG | HEIGHT: 69 IN

## 2018-06-21 DIAGNOSIS — M25.551 PAIN OF BOTH HIP JOINTS: Primary | ICD-10-CM

## 2018-06-21 DIAGNOSIS — M25.552 PAIN OF BOTH HIP JOINTS: Primary | ICD-10-CM

## 2018-06-21 PROCEDURE — 99213 OFFICE O/P EST LOW 20 MIN: CPT | Performed by: INTERNAL MEDICINE

## 2018-06-21 RX ORDER — CYCLOBENZAPRINE HCL 10 MG
10 TABLET ORAL 3 TIMES DAILY PRN
Qty: 30 TABLET | Refills: 0 | Status: SHIPPED | OUTPATIENT
Start: 2018-06-21 | End: 2018-10-04

## 2018-06-21 NOTE — PROGRESS NOTES
Subjective   Waqas Odonnell is a 57 y.o. male.     Chief Complaint   Patient presents with   • Hip Pain     bilateral   • Leg Pain     bilateral         Hip Pain    The incident occurred more than 1 week ago. There was no injury mechanism. The pain is present in the left hip and right hip. The quality of the pain is described as aching. The pain is severe. The pain has been constant since onset. He has tried heat for the symptoms. The treatment provided mild relief.        The following portions of the patient's history were reviewed and updated as appropriate: allergies, current medications, past social history and problem list.    Outpatient Prescriptions Marked as Taking for the 6/21/18 encounter (Office Visit) with Brian Boo MD   Medication Sig Dispense Refill   • aspirin 81 MG EC tablet Take 81 mg by mouth Daily.     • LIVALO 4 MG tablet Take 1 tablet by mouth Daily. 90 tablet 1   • raNITIdine (ZANTAC) 150 MG tablet Take 1 tablet by mouth 2 (Two) Times a Day. 180 tablet 3   • SYNTHROID 175 MCG tablet TAKE 1 TABLET DAILY 90 tablet 0       Review of Systems   Constitutional: Negative for chills and fever.   Musculoskeletal: Positive for arthralgias and gait problem. Negative for back pain and joint swelling.       Objective   Vitals:    06/21/18 0953   BP: 138/82   Pulse: 58   Resp: 16   Temp: 98.1 °F (36.7 °C)   SpO2: 98%      1    06/21/18  0953   Weight: 80.4 kg (177 lb 3.2 oz)    [unfilled]  Body mass index is 26.16 kg/m².      Physical Exam   Constitutional: He appears well-developed and well-nourished.   Musculoskeletal: Normal range of motion. He exhibits no edema, tenderness or deformity.         Problem List Items Addressed This Visit     None      Visit Diagnoses     Pain of both hip joints    -  Primary        Assessment/Plan   In with pain in left hip began 2 weeks ago.  Nondescript.  This felt like muscle spasms.  Lasted about a week and then resolved.  One week ago the right hip started  having the same problem.  Again it's mainly in the hip area and gluteal area but radiating into the right groin.  He took a muscle relaxant last night which helped.  His exam is unrevealing other than having a lot of muscle spasm when he lay down.  The symptoms are most unusual.  He eats no uncooked pork.  Suspect this is mild sinus related to Livalo.  He is going to stop it.  We'll check some lab work today including a CBC, CMP, sedimentation rate, Tsh, Lipids, CPK and aldolase.  Continue muscle relaxants for now.             .[email or web address]  Dragon disclaimer:   Much of this encounter note is an electronic transcription/translation of spoken language to printed text. The electronic translation of spoken language may permit erroneous, or at times, nonsensical words or phrases to be inadvertently transcribed; Although I have reviewed the note for such errors, some may still exist.

## 2018-06-21 NOTE — PATIENT INSTRUCTIONS
Exercising to Lose Weight  Exercising can help you to lose weight. In order to lose weight through exercise, you need to do vigorous-intensity exercise. You can tell that you are exercising with vigorous intensity if you are breathing very hard and fast and cannot hold a conversation while exercising.  Moderate-intensity exercise helps to maintain your current weight. You can tell that you are exercising at a moderate level if you have a higher heart rate and faster breathing, but you are still able to hold a conversation.  How often should I exercise?  Choose an activity that you enjoy and set realistic goals. Your health care provider can help you to make an activity plan that works for you. Exercise regularly as directed by your health care provider. This may include:  · Doing resistance training twice each week, such as:  ? Push-ups.  ? Sit-ups.  ? Lifting weights.  ? Using resistance bands.  · Doing a given intensity of exercise for a given amount of time. Choose from these options:  ? 150 minutes of moderate-intensity exercise every week.  ? 75 minutes of vigorous-intensity exercise every week.  ? A mix of moderate-intensity and vigorous-intensity exercise every week.    Children, pregnant women, people who are out of shape, people who are overweight, and older adults may need to consult a health care provider for individual recommendations. If you have any sort of medical condition, be sure to consult your health care provider before starting a new exercise program.  What are some activities that can help me to lose weight?  · Walking at a rate of at least 4.5 miles an hour.  · Jogging or running at a rate of 5 miles per hour.  · Biking at a rate of at least 10 miles per hour.  · Lap swimming.  · Roller-skating or in-line skating.  · Cross-country skiing.  · Vigorous competitive sports, such as football, basketball, and soccer.  · Jumping rope.  · Aerobic dancing.  How can I be more active in my day-to-day  activities?  · Use the stairs instead of the elevator.  · Take a walk during your lunch break.  · If you drive, park your car farther away from work or school.  · If you take public transportation, get off one stop early and walk the rest of the way.  · Make all of your phone calls while standing up and walking around.  · Get up, stretch, and walk around every 30 minutes throughout the day.  What guidelines should I follow while exercising?  · Do not exercise so much that you hurt yourself, feel dizzy, or get very short of breath.  · Consult your health care provider prior to starting a new exercise program.  · Wear comfortable clothes and shoes with good support.  · Drink plenty of water while you exercise to prevent dehydration or heat stroke. Body water is lost during exercise and must be replaced.  · Work out until you breathe faster and your heart beats faster.  This information is not intended to replace advice given to you by your health care provider. Make sure you discuss any questions you have with your health care provider.  Document Released: 01/20/2012 Document Revised: 05/25/2017 Document Reviewed: 05/21/2015  [employer] Interactive Patient Education © 2018 [employer]  Calorie Counting for Weight Loss  Calories are units of energy. Your body needs a certain amount of calories from food to keep you going throughout the day. When you eat more calories than your body needs, your body stores the extra calories as fat. When you eat fewer calories than your body needs, your body burns fat to get the energy it needs.  Calorie counting means keeping track of how many calories you eat and drink each day. Calorie counting can be helpful if you need to lose weight. If you make sure to eat fewer calories than your body needs, you should lose weight. Ask your health care provider what a healthy weight is for you.  For calorie counting to work, you will need to eat the right number of calories in a day in order to  lose a healthy amount of weight per week. A dietitian can help you determine how many calories you need in a day and will give you suggestions on how to reach your calorie goal.  · A healthy amount of weight to lose per week is usually 1-2 lb (0.5-0.9 kg). This usually means that your daily calorie intake should be reduced by 500-750 calories.  · Eating 1,200 - 1,500 calories per day can help most women lose weight.  · Eating 1,500 - 1,800 calories per day can help most men lose weight.    What is my plan?  My goal is to have __________ calories per day.  If I have this many calories per day, I should lose around __________ pounds per week.  What do I need to know about calorie counting?  In order to meet your daily calorie goal, you will need to:  · Find out how many calories are in each food you would like to eat. Try to do this before you eat.  · Decide how much of the food you plan to eat.  · Write down what you ate and how many calories it had. Doing this is called keeping a food log.    To successfully lose weight, it is important to balance calorie counting with a healthy lifestyle that includes regular activity. Aim for 150 minutes of moderate exercise (such as walking) or 75 minutes of vigorous exercise (such as running) each week.  Where do I find calorie information?    The number of calories in a food can be found on a Nutrition Facts label. If a food does not have a Nutrition Facts label, try to look up the calories online or ask your dietitian for help.  Remember that calories are listed per serving. If you choose to have more than one serving of a food, you will have to multiply the calories per serving by the amount of servings you plan to eat. For example, the label on a package of bread might say that a serving size is 1 slice and that there are 90 calories in a serving. If you eat 1 slice, you will have eaten 90 calories. If you eat 2 slices, you will have eaten 180 calories.  How do I keep a food  "log?  Immediately after each meal, record the following information in your food log:  · What you ate. Don't forget to include toppings, sauces, and other extras on the food.  · How much you ate. This can be measured in cups, ounces, or number of items.  · How many calories each food and drink had.  · The total number of calories in the meal.    Keep your food log near you, such as in a small notebook in your pocket, or use a mobile aleksandra or website. Some programs will calculate calories for you and show you how many calories you have left for the day to meet your goal.  What are some calorie counting tips?  · Use your calories on foods and drinks that will fill you up and not leave you hungry:  ? Some examples of foods that fill you up are nuts and nut butters, vegetables, lean proteins, and high-fiber foods like whole grains. High-fiber foods are foods with more than 5 g fiber per serving.  ? Drinks such as sodas, specialty coffee drinks, alcohol, and juices have a lot of calories, yet do not fill you up.  · Eat nutritious foods and avoid empty calories. Empty calories are calories you get from foods or beverages that do not have many vitamins or protein, such as candy, sweets, and soda. It is better to have a nutritious high-calorie food (such as an avocado) than a food with few nutrients (such as a bag of chips).  · Know how many calories are in the foods you eat most often. This will help you calculate calorie counts faster.  · Pay attention to calories in drinks. Low-calorie drinks include water and unsweetened drinks.  · Pay attention to nutrition labels for \"low fat\" or \"fat free\" foods. These foods sometimes have the same amount of calories or more calories than the full fat versions. They also often have added sugar, starch, or salt, to make up for flavor that was removed with the fat.  · Find a way of tracking calories that works for you. Get creative. Try different apps or programs if writing down calories " does not work for you.  What are some portion control tips?  · Know how many calories are in a serving. This will help you know how many servings of a certain food you can have.  · Use a measuring cup to measure serving sizes. You could also try weighing out portions on a kitchen scale. With time, you will be able to estimate serving sizes for some foods.  · Take some time to put servings of different foods on your favorite plates, bowls, and cups so you know what a serving looks like.  · Try not to eat straight from a bag or box. Doing this can lead to overeating. Put the amount you would like to eat in a cup or on a plate to make sure you are eating the right portion.  · Use smaller plates, glasses, and bowls to prevent overeating.  · Try not to multitask (for example, watch TV or use your computer) while eating. If it is time to eat, sit down at a table and enjoy your food. This will help you to know when you are full. It will also help you to be aware of what you are eating and how much you are eating.  What are tips for following this plan?  Reading food labels  · Check the calorie count compared to the serving size. The serving size may be smaller than what you are used to eating.  · Check the source of the calories. Make sure the food you are eating is high in vitamins and protein and low in saturated and trans fats.  Shopping  · Read nutrition labels while you shop. This will help you make healthy decisions before you decide to purchase your food.  · Make a grocery list and stick to it.  Cooking  · Try to cook your favorite foods in a healthier way. For example, try baking instead of frying.  · Use low-fat dairy products.  Meal planning  · Use more fruits and vegetables. Half of your plate should be fruits and vegetables.  · Include lean proteins like poultry and fish.  How do I count calories when eating out?  · Ask for smaller portion sizes.  · Consider sharing an entree and sides instead of getting your  own entree.  · If you get your own entree, eat only half. Ask for a box at the beginning of your meal and put the rest of your entree in it so you are not tempted to eat it.  · If calories are listed on the menu, choose the lower calorie options.  · Choose dishes that include vegetables, fruits, whole grains, low-fat dairy products, and lean protein.  · Choose items that are boiled, broiled, grilled, or steamed. Stay away from items that are buttered, battered, fried, or served with cream sauce. Items labeled “crispy” are usually fried, unless stated otherwise.  · Choose water, low-fat milk, unsweetened iced tea, or other drinks without added sugar. If you want an alcoholic beverage, choose a lower calorie option such as a glass of wine or light beer.  · Ask for dressings, sauces, and syrups on the side. These are usually high in calories, so you should limit the amount you eat.  · If you want a salad, choose a garden salad and ask for grilled meats. Avoid extra toppings like barreto, cheese, or fried items. Ask for the dressing on the side, or ask for olive oil and vinegar or lemon to use as dressing.  · Estimate how many servings of a food you are given. For example, a serving of cooked rice is ½ cup or about the size of half a baseball. Knowing serving sizes will help you be aware of how much food you are eating at restaurants. The list below tells you how big or small some common portion sizes are based on everyday objects:  ? 1 oz--4 stacked dice.  ? 3 oz--1 deck of cards.  ? 1 tsp--1 die.  ? 1 Tbsp--½ a ping-pong ball.  ? 2 Tbsp--1 ping-pong ball.  ? ½ cup--½ baseball.  ? 1 cup--1 baseball.  Summary  · Calorie counting means keeping track of how many calories you eat and drink each day. If you eat fewer calories than your body needs, you should lose weight.  · A healthy amount of weight to lose per week is usually 1-2 lb (0.5-0.9 kg). This usually means reducing your daily calorie intake by 500-750  calories.  · The number of calories in a food can be found on a Nutrition Facts label. If a food does not have a Nutrition Facts label, try to look up the calories online or ask your dietitian for help.  · Use your calories on foods and drinks that will fill you up, and not on foods and drinks that will leave you hungry.  · Use smaller plates, glasses, and bowls to prevent overeating.  This information is not intended to replace advice given to you by your health care provider. Make sure you discuss any questions you have with your health care provider.  Document Released: 12/18/2006 Document Revised: 11/17/2017 Document Reviewed: 11/17/2017  [employer] Interactive Patient Education © 2018 [employer]  BMI for Adults  Body mass index (BMI) is a number that is calculated from a person's weight and height. In most adults, the number is used to find how much of an adult's weight is made up of fat. BMI is not as accurate as a direct measure of body fat.  How is BMI calculated?  BMI is calculated by dividing weight in kilograms by height in meters squared. It can also be calculated by dividing weight in pounds by height in inches squared, then multiplying the resulting number by 703. Charts are available to help you find your BMI quickly and easily without doing this calculation.  How is BMI interpreted?  Health care professionals use BMI charts to identify whether an adult is underweight, at a normal weight, or overweight based on the following guidelines:  · Underweight: BMI less than 18.5.  · Normal weight: BMI between 18.5 and 24.9.  · Overweight: BMI between 25 and 29.9.  · Obese: BMI of 30 and above.    BMI is usually interpreted the same for males and females.  Weight includes both fat and muscle, so someone with a muscular build, such as an athlete, may have a BMI that is higher than 24.9. In cases like these, BMI may not accurately depict body fat. To determine if excess body fat is the cause of a BMI of 25 or  higher, further assessments may need to be done by a health care provider.  Why is BMI a useful tool?  BMI is used to identify a possible weight problem that may be related to a medical problem or may increase the risk for medical problems. BMI can also be used to promote changes to reach a healthy weight.  This information is not intended to replace advice given to you by your health care provider. Make sure you discuss any questions you have with your health care provider.  Document Released: 08/29/2005 Document Revised: 04/27/2017 Document Reviewed: 05/15/2015  [employer] Interactive Patient Education © 2018 [employer]

## 2018-06-22 LAB
ALBUMIN SERPL-MCNC: 4.8 G/DL (ref 3.5–5.2)
ALBUMIN/GLOB SERPL: 2.5 G/DL
ALDOLASE SERPL-CCNC: 3.2 U/L (ref 3.3–10.3)
ALP SERPL-CCNC: 53 U/L (ref 39–117)
ALT SERPL-CCNC: 15 U/L (ref 1–41)
AST SERPL-CCNC: 15 U/L (ref 1–40)
BASOPHILS # BLD AUTO: 0.01 10*3/MM3 (ref 0–0.2)
BASOPHILS NFR BLD AUTO: 0.2 % (ref 0–1.5)
BILIRUB SERPL-MCNC: 0.7 MG/DL (ref 0.1–1.2)
BUN SERPL-MCNC: 14 MG/DL (ref 6–20)
BUN/CREAT SERPL: 16.9 (ref 7–25)
CALCIUM SERPL-MCNC: 9.4 MG/DL (ref 8.6–10.5)
CHLORIDE SERPL-SCNC: 103 MMOL/L (ref 98–107)
CHOLEST SERPL-MCNC: 202 MG/DL (ref 0–200)
CK SERPL-CCNC: 115 U/L (ref 20–200)
CO2 SERPL-SCNC: 23.6 MMOL/L (ref 22–29)
CREAT SERPL-MCNC: 0.83 MG/DL (ref 0.76–1.27)
EOSINOPHIL # BLD AUTO: 0.2 10*3/MM3 (ref 0–0.7)
EOSINOPHIL NFR BLD AUTO: 3.6 % (ref 0.3–6.2)
ERYTHROCYTE [DISTWIDTH] IN BLOOD BY AUTOMATED COUNT: 13.4 % (ref 11.5–14.5)
ERYTHROCYTE [SEDIMENTATION RATE] IN BLOOD BY WESTERGREN METHOD: 1 MM/HR (ref 0–20)
GFR SERPLBLD CREATININE-BSD FMLA CKD-EPI: 116 ML/MIN/1.73
GFR SERPLBLD CREATININE-BSD FMLA CKD-EPI: 95 ML/MIN/1.73
GLOBULIN SER CALC-MCNC: 1.9 GM/DL
GLUCOSE SERPL-MCNC: 105 MG/DL (ref 65–99)
HCT VFR BLD AUTO: 48.6 % (ref 40.4–52.2)
HDLC SERPL-MCNC: 83 MG/DL (ref 40–60)
HGB BLD-MCNC: 15.9 G/DL (ref 13.7–17.6)
IMM GRANULOCYTES # BLD: 0.01 10*3/MM3 (ref 0–0.03)
IMM GRANULOCYTES NFR BLD: 0.2 % (ref 0–0.5)
LDLC SERPL CALC-MCNC: 95 MG/DL (ref 0–100)
LYMPHOCYTES # BLD AUTO: 2.05 10*3/MM3 (ref 0.9–4.8)
LYMPHOCYTES NFR BLD AUTO: 36.8 % (ref 19.6–45.3)
MCH RBC QN AUTO: 31.5 PG (ref 27–32.7)
MCHC RBC AUTO-ENTMCNC: 32.7 G/DL (ref 32.6–36.4)
MCV RBC AUTO: 96.2 FL (ref 79.8–96.2)
MONOCYTES # BLD AUTO: 0.51 10*3/MM3 (ref 0.2–1.2)
MONOCYTES NFR BLD AUTO: 9.2 % (ref 5–12)
NEUTROPHILS # BLD AUTO: 2.8 10*3/MM3 (ref 1.9–8.1)
NEUTROPHILS NFR BLD AUTO: 50.2 % (ref 42.7–76)
PLATELET # BLD AUTO: 217 10*3/MM3 (ref 140–500)
POTASSIUM SERPL-SCNC: 4.3 MMOL/L (ref 3.5–5.2)
PROT SERPL-MCNC: 6.7 G/DL (ref 6–8.5)
RBC # BLD AUTO: 5.05 10*6/MM3 (ref 4.6–6)
SODIUM SERPL-SCNC: 141 MMOL/L (ref 136–145)
TRIGL SERPL-MCNC: 119 MG/DL (ref 0–150)
TSH SERPL DL<=0.005 MIU/L-ACNC: 2.36 MIU/ML (ref 0.27–4.2)
VLDLC SERPL CALC-MCNC: 23.8 MG/DL (ref 5–40)
WBC # BLD AUTO: 5.57 10*3/MM3 (ref 4.5–10.7)

## 2018-06-25 ENCOUNTER — TELEPHONE (OUTPATIENT)
Dept: INTERNAL MEDICINE | Facility: CLINIC | Age: 58
End: 2018-06-25

## 2018-06-25 NOTE — TELEPHONE ENCOUNTER
----- Message from Brian Boo MD sent at 6/22/2018  3:55 PM EDT -----  Call patient.  His lab work is excellent.  I still think his symptoms were related to his cholesterol treatment.  I saw someone yesterday who had the exact same symptoms several years ago after 5 years of treatment with the same medication.  Give me a call in one week with a progress report.    Informed patient.  What should he do regarding his cholesterol treatment now?  Please advise.    First we need to see if his symptoms resolve off of the cholesterol medicine.  If they all clear up that he needs to come in for a visit and we will discuss the pros and cons of cholesterol treatment.  Also the various options.

## 2018-07-16 ENCOUNTER — TELEPHONE (OUTPATIENT)
Dept: INTERNAL MEDICINE | Facility: CLINIC | Age: 58
End: 2018-07-16

## 2018-07-16 RX ORDER — LEVOTHYROXINE SODIUM 175 MCG
TABLET ORAL
Qty: 90 TABLET | Refills: 0 | Status: SHIPPED | OUTPATIENT
Start: 2018-07-16 | End: 2019-03-11 | Stop reason: SDUPTHER

## 2018-07-16 NOTE — TELEPHONE ENCOUNTER
Patient called back to give you a week's progress report, per your request. This is regarding his muscle spasms and left hip pain (office visit 6-21-18). Per patient, he is 80% better. He still cannot take off running like he used to. He did have to go up and down the stairs about 3 or 4 times the other day which caused him to hurt some. Please advise on what to from this point.      Right now it sounds like it's his cholesterol medicine.  I would continue to stay off of it.  He can take some additional vitamin D tablets 2,000 units daily along with some co Q 10 200 mg daily to help out.

## 2018-08-13 RX ORDER — RANITIDINE 150 MG/1
TABLET ORAL
Qty: 90 TABLET | Refills: 1 | Status: SHIPPED | OUTPATIENT
Start: 2018-08-13 | End: 2018-11-16 | Stop reason: SDUPTHER

## 2018-10-04 ENCOUNTER — OFFICE VISIT (OUTPATIENT)
Dept: INTERNAL MEDICINE | Facility: CLINIC | Age: 58
End: 2018-10-04

## 2018-10-04 VITALS
OXYGEN SATURATION: 98 % | HEART RATE: 74 BPM | HEIGHT: 69 IN | SYSTOLIC BLOOD PRESSURE: 122 MMHG | BODY MASS INDEX: 25.77 KG/M2 | TEMPERATURE: 98.2 F | WEIGHT: 174 LBS | DIASTOLIC BLOOD PRESSURE: 82 MMHG | RESPIRATION RATE: 16 BRPM

## 2018-10-04 DIAGNOSIS — K21.9 GASTROESOPHAGEAL REFLUX DISEASE WITHOUT ESOPHAGITIS: ICD-10-CM

## 2018-10-04 DIAGNOSIS — E78.5 HYPERLIPIDEMIA, UNSPECIFIED HYPERLIPIDEMIA TYPE: ICD-10-CM

## 2018-10-04 DIAGNOSIS — Z00.00 ENCOUNTER FOR PREVENTIVE HEALTH EXAMINATION: Primary | ICD-10-CM

## 2018-10-04 DIAGNOSIS — E03.9 HYPOTHYROIDISM, UNSPECIFIED TYPE: ICD-10-CM

## 2018-10-04 LAB
CHOLEST SERPL-MCNC: 274 MG/DL (ref 0–200)
HDLC SERPL-MCNC: 87 MG/DL (ref 40–60)
LDLC SERPL CALC-MCNC: 168 MG/DL (ref 0–100)
TRIGL SERPL-MCNC: 94 MG/DL (ref 0–150)
VLDLC SERPL CALC-MCNC: 18.8 MG/DL (ref 5–40)

## 2018-10-04 PROCEDURE — 99396 PREV VISIT EST AGE 40-64: CPT | Performed by: INTERNAL MEDICINE

## 2018-10-04 NOTE — PROGRESS NOTES
Subjective   Waqas Odonnell is a 58 y.o. male.     No chief complaint on file.        In for annual preventative exam.  He gets about 4-5 hours of sleep at night.  Feels rested.  Energy is okay.  Is been high strung.  No significant exercise other than the being very active at work.  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  Works in the [employer].  Diet is balanced.           The following portions of the patient's history were reviewed and updated as appropriate: allergies, current medications, past social history and problem list.    HISTORY  Outpatient Prescriptions Marked as Taking for the 10/4/18 encounter (Office Visit) with Brian Boo MD   Medication Sig Dispense Refill   • raNITIdine (ZANTAC) 150 MG tablet TAKE 1 TABLET TWICE A DAY 90 tablet 1   • SYNTHROID 175 MCG tablet TAKE 1 TABLET DAILY 90 tablet 0   • [DISCONTINUED] aspirin 81 MG EC tablet Take 81 mg by mouth Daily.       Social History     Social History   • Marital status:      Spouse name: N/A   • Number of children: N/A   • Years of education: N/A     Occupational History   • Not on file.     Social History Main Topics   • Smoking status: Former Smoker     Types: Cigarettes     Start date: 1980     Quit date: 1991   • Smokeless tobacco: Never Used      Comment: Weekend smoker   • Alcohol use 0.0 - 7.2 oz/week      Comment: Beer: Weekends   • Drug use: Unknown   • Sexual activity: Not on file     Other Topics Concern   • Not on file     Social History Narrative   • No narrative on file     Family History   Problem Relation Age of Onset   • Diabetes Mother    • Heart attack Father    • No Known Problems Sister    • No Known Problems Brother    • No Known Problems Daughter    • No Known Problems Son    • No Known Problems Brother      No past medical history on file.  No past surgical history on file.    Review of Systems   Constitutional: Positive for diaphoresis. Negative for chills, fatigue and fever.    HENT: Negative for congestion, ear pain, nosebleeds, rhinorrhea, sore throat and voice change.    Eyes: Negative for discharge, itching and visual disturbance.   Respiratory: Negative for cough, chest tightness, shortness of breath and wheezing.    Cardiovascular: Negative for chest pain, palpitations and leg swelling.   Gastrointestinal: Positive for abdominal pain (heartburn controlled with Zantac). Negative for anal bleeding, constipation, diarrhea, nausea and vomiting.   Endocrine: Negative for cold intolerance, heat intolerance and polyuria.   Genitourinary: Negative for difficulty urinating, dysuria, frequency, hematuria and urgency.   Musculoskeletal: Positive for neck pain. Negative for arthralgias, back pain and myalgias.   Skin: Negative for rash and wound.   Allergic/Immunologic: Negative for environmental allergies.   Neurological: Negative for dizziness, syncope, weakness, light-headedness and headaches.   Hematological: Negative for adenopathy. Does not bruise/bleed easily.   Psychiatric/Behavioral: Negative for confusion and sleep disturbance. The patient is nervous/anxious.        Objective   Vitals:    10/04/18 1457   BP: 122/82   Pulse: 74   Resp: 16   Temp: 98.2 °F (36.8 °C)   SpO2: 98%      1    10/04/18  1457   Weight: 78.9 kg (174 lb)    [unfilled]  Body mass index is 25.68 kg/m².      Physical Exam   Constitutional: He is oriented to person, place, and time. He appears well-developed and well-nourished.   HENT:   Head: Normocephalic and atraumatic.   Right Ear: External ear normal.   Left Ear: External ear normal.   Nose: Nose normal.   Mouth/Throat: Oropharynx is clear and moist.   Eyes: Pupils are equal, round, and reactive to light. Conjunctivae and EOM are normal. No scleral icterus.   Neck: Normal range of motion. Neck supple. No JVD present. No thyromegaly present.   Cardiovascular: Normal rate, regular rhythm, normal heart sounds and intact distal pulses.  Exam reveals no gallop  and no friction rub.    No murmur heard.  Pulmonary/Chest: Effort normal and breath sounds normal. No respiratory distress. He has no wheezes. He has no rales.   Abdominal: Soft. Bowel sounds are normal. He exhibits no distension and no mass. There is no tenderness. There is no rebound and no guarding. No hernia.   Genitourinary: Rectum normal, prostate normal and penis normal.   Musculoskeletal: Normal range of motion. He exhibits no edema.   Lymphadenopathy:     He has no cervical adenopathy.   Neurological: He is alert and oriented to person, place, and time. He has normal reflexes. He displays normal reflexes.   Skin: Skin is warm and dry.   Psychiatric: He has a normal mood and affect. His behavior is normal. Judgment and thought content normal.   Nursing note and vitals reviewed.        Problem List Items Addressed This Visit        Cardiovascular and Mediastinum    Hyperlipidemia       Digestive    Gastroesophageal reflux disease without esophagitis       Endocrine    Hypothyroidism      Other Visit Diagnoses     Encounter for preventive health examination    -  Primary        Assessment/Plan   In for annual preventative exam.  Overall health is been excellent.  He has hypothyroidism on replacement.  Hyperlipidemia.  GERD controlled on ranitidine.  We'll plan on checking lipids every 6 months.  Annual lab today June 2018.  Colonoscopy up-to-date at age 50.  Flu shots at work.  We'll monitor lipids in 6 month intervals.  Due for shingrix.  He's been intolerant to statins.  Tried Livalo, pravastatin, atorvastatin.  If lipids are up today, will consider trying on Zetia next.  He feels like he is hyper a lot of the time does not want to take medicine for it.  He feels fine himself but his girlfriend and daughter thinks he he's too hyper.  Has some anger issues.  Would probably benefit from an SSRI.  He'll call back if he changes his mind.         Dragon disclaimer:   Much of this encounter note is an electronic  transcription/translation of spoken language to printed text. The electronic translation of spoken language may permit erroneous, or at times, nonsensical words or phrases to be inadvertently transcribed; Although I have reviewed the note for such errors, some may still exist.

## 2018-11-16 RX ORDER — RANITIDINE 150 MG/1
TABLET ORAL
Qty: 180 TABLET | Refills: 1 | Status: SHIPPED | OUTPATIENT
Start: 2018-11-16 | End: 2019-05-20 | Stop reason: SDUPTHER

## 2018-12-11 RX ORDER — LEVOTHYROXINE SODIUM 175 MCG
TABLET ORAL
Qty: 90 TABLET | Refills: 0 | Status: SHIPPED | OUTPATIENT
Start: 2018-12-11 | End: 2019-03-11 | Stop reason: SDUPTHER

## 2019-03-11 RX ORDER — LEVOTHYROXINE SODIUM 175 MCG
TABLET ORAL
Qty: 90 TABLET | Refills: 0 | Status: SHIPPED | OUTPATIENT
Start: 2019-03-11 | End: 2019-04-02

## 2019-04-02 ENCOUNTER — OFFICE VISIT (OUTPATIENT)
Dept: INTERNAL MEDICINE | Facility: CLINIC | Age: 59
End: 2019-04-02

## 2019-04-02 VITALS
DIASTOLIC BLOOD PRESSURE: 82 MMHG | SYSTOLIC BLOOD PRESSURE: 128 MMHG | HEART RATE: 67 BPM | OXYGEN SATURATION: 99 % | RESPIRATION RATE: 16 BRPM | BODY MASS INDEX: 26.51 KG/M2 | TEMPERATURE: 98.2 F | HEIGHT: 69 IN | WEIGHT: 179 LBS

## 2019-04-02 DIAGNOSIS — K21.9 GASTROESOPHAGEAL REFLUX DISEASE WITHOUT ESOPHAGITIS: ICD-10-CM

## 2019-04-02 DIAGNOSIS — E03.9 HYPOTHYROIDISM, UNSPECIFIED TYPE: ICD-10-CM

## 2019-04-02 DIAGNOSIS — E78.5 HYPERLIPIDEMIA, UNSPECIFIED HYPERLIPIDEMIA TYPE: Primary | ICD-10-CM

## 2019-04-02 LAB
CHOLEST SERPL-MCNC: 287 MG/DL (ref 0–200)
HDLC SERPL-MCNC: 84 MG/DL (ref 40–60)
LDLC SERPL CALC-MCNC: 184 MG/DL (ref 0–100)
TRIGL SERPL-MCNC: 96 MG/DL (ref 0–150)
VLDLC SERPL CALC-MCNC: 19.2 MG/DL

## 2019-04-02 PROCEDURE — 99213 OFFICE O/P EST LOW 20 MIN: CPT | Performed by: INTERNAL MEDICINE

## 2019-04-02 NOTE — PROGRESS NOTES
Subjective   Waqas Odonnell is a 58 y.o. male.     Chief Complaint   Patient presents with   • Hyperlipidemia   • Hypothyroidism         Hyperlipidemia   This is a chronic problem. The current episode started more than 1 year ago. The problem is controlled. Recent lipid tests were reviewed and are normal. Exacerbating diseases include hypothyroidism. There are no known factors aggravating his hyperlipidemia. Pertinent negatives include no chest pain or shortness of breath. Current antihyperlipidemic treatment includes statins. The current treatment provides significant improvement of lipids. There are no compliance problems.  Risk factors for coronary artery disease include dyslipidemia.   Hypothyroidism   This is a chronic problem. The current episode started more than 1 year ago. The problem occurs constantly. The problem has been unchanged. Associated symptoms include nausea. Pertinent negatives include no abdominal pain, chest pain, chills, fever or vomiting. Nothing aggravates the symptoms. The treatment provided significant relief.   Heartburn   He complains of heartburn and nausea. He reports no abdominal pain, no chest pain or no wheezing. This is a chronic problem. The current episode started more than 1 year ago. The problem occurs occasionally. The problem has been unchanged. The heartburn is of mild intensity. The symptoms are aggravated by certain foods and caffeine. Risk factors include caffeine use. He has tried a histamine-2 antagonist for the symptoms. The treatment provided significant relief.        The following portions of the patient's history were reviewed and updated as appropriate: allergies, current medications, past social history and problem list.    Outpatient Medications Marked as Taking for the 4/2/19 encounter (Office Visit) with Brian Boo MD   Medication Sig Dispense Refill   • raNITIdine (ZANTAC) 150 MG tablet TAKE 1 TABLET TWICE A  tablet 1       Review of Systems    Constitutional: Negative for chills and fever.   Respiratory: Negative for shortness of breath and wheezing.    Cardiovascular: Negative for chest pain and palpitations.   Gastrointestinal: Positive for heartburn and nausea. Negative for abdominal distention, abdominal pain, constipation, diarrhea and vomiting.       Objective   Vitals:    04/02/19 1457   BP: 128/82   Pulse: 67   Resp: 16   Temp: 98.2 °F (36.8 °C)   SpO2: 99%          04/02/19  1457   Weight: 81.2 kg (179 lb)    [unfilled]  Body mass index is 26.42 kg/m².      Physical Exam   Constitutional: He appears well-developed and well-nourished. No distress.   HENT:   Head: Normocephalic and atraumatic.   Neck: Carotid bruit is not present. No thyromegaly present.   Cardiovascular: Normal rate, regular rhythm, normal heart sounds and intact distal pulses. Exam reveals no gallop.   No murmur heard.  Pulmonary/Chest: Effort normal and breath sounds normal. No respiratory distress. He has no wheezes. He has no rales.   Abdominal: Soft. Bowel sounds are normal. He exhibits no mass. There is no tenderness. There is no guarding.         Problem List Items Addressed This Visit        Cardiovascular and Mediastinum    Hyperlipidemia - Primary       Digestive    Gastroesophageal reflux disease without esophagitis       Endocrine    Hypothyroidism        Assessment/Plan   In for recheck of hyperlipidemia, GERD, and hypothyroidism.  His GERD is worse.  Requires Zantac twice daily and runs out early.  Frequently takes 3/day.  We'll boost dose to twice a day routinely.  Thyroid is under good control.  Lipids under treatment with a statin.  He gets a lipid profile every 6 months.  Annual lab work and annual exam September 2019.  He plans on changing to levothyroxine in order to save money on his insurance.  He would have to take 2 of the 88 MCG tablets to approximate the 175 MCG dose.  He is going to check with his insurance company make sure that is acceptable.          Dragon disclaimer:   Much of this encounter note is an electronic transcription/translation of spoken language to printed text. The electronic translation of spoken language may permit erroneous, or at times, nonsensical words or phrases to be inadvertently transcribed; Although I have reviewed the note for such errors, some may still exist.

## 2019-04-02 NOTE — PATIENT INSTRUCTIONS
Exercising to Stay Healthy  Exercising regularly is important. It has many health benefits, such as:  · Improving your overall fitness, flexibility, and endurance.  · Increasing your bone density.  · Helping with weight control.  · Decreasing your body fat.  · Increasing your muscle strength.  · Reducing stress and tension.  · Improving your overall health.    In order to become healthy and stay healthy, it is recommended that you do moderate-intensity and vigorous-intensity exercise. You can tell that you are exercising at a moderate intensity if you have a higher heart rate and faster breathing, but you are still able to hold a conversation. You can tell that you are exercising at a vigorous intensity if you are breathing much harder and faster and cannot hold a conversation while exercising.  How often should I exercise?  Choose an activity that you enjoy and set realistic goals. Your health care provider can help you to make an activity plan that works for you. Exercise regularly as directed by your health care provider. This may include:  · Doing resistance training twice each week, such as:  ? Push-ups.  ? Sit-ups.  ? Lifting weights.  ? Using resistance bands.  · Doing a given intensity of exercise for a given amount of time. Choose from these options:  ? 150 minutes of moderate-intensity exercise every week.  ? 75 minutes of vigorous-intensity exercise every week.  ? A mix of moderate-intensity and vigorous-intensity exercise every week.    Children, pregnant women, people who are out of shape, people who are overweight, and older adults may need to consult a health care provider for individual recommendations. If you have any sort of medical condition, be sure to consult your health care provider before starting a new exercise program.  What are some exercise ideas?  Some moderate-intensity exercise ideas include:  · Walking at a rate of 1 mile in 15  minutes.  · Biking.  · Hiking.  · Golfing.  · Dancing.    Some vigorous-intensity exercise ideas include:  · Walking at a rate of at least 4.5 miles per hour.  · Jogging or running at a rate of 5 miles per hour.  · Biking at a rate of at least 10 miles per hour.  · Lap swimming.  · Roller-skating or in-line skating.  · Cross-country skiing.  · Vigorous competitive sports, such as football, basketball, and soccer.  · Jumping rope.  · Aerobic dancing.    What are some everyday activities that can help me to get exercise?  · Yard work, such as:  ? Pushing a .  ? Raking and bagging leaves.  · Washing and waxing your car.  · Pushing a stroller.  · Shoveling snow.  · Gardening.  · Washing windows or floors.  How can I be more active in my day-to-day activities?  · Use the stairs instead of the elevator.  · Take a walk during your lunch break.  · If you drive, park your car farther away from work or school.  · If you take public transportation, get off one stop early and walk the rest of the way.  · Make all of your phone calls while standing up and walking around.  · Get up, stretch, and walk around every 30 minutes throughout the day.  What guidelines should I follow while exercising?  · Do not exercise so much that you hurt yourself, feel dizzy, or get very short of breath.  · Consult your health care provider before starting a new exercise program.  · Wear comfortable clothes and shoes with good support.  · Drink plenty of water while you exercise to prevent dehydration or heat stroke. Body water is lost during exercise and must be replaced.  · Work out until you breathe faster and your heart beats faster.  This information is not intended to replace advice given to you by your health care provider. Make sure you discuss any questions you have with your health care provider.  Document Released: 01/20/2012 Document Revised: 05/25/2017 Document Reviewed: 05/21/2015  [employer] Interactive Patient Education © 2018  [employer]  Calorie Counting for Weight Loss  Calories are units of energy. Your body needs a certain amount of calories from food to keep you going throughout the day. When you eat more calories than your body needs, your body stores the extra calories as fat. When you eat fewer calories than your body needs, your body burns fat to get the energy it needs.  Calorie counting means keeping track of how many calories you eat and drink each day. Calorie counting can be helpful if you need to lose weight. If you make sure to eat fewer calories than your body needs, you should lose weight. Ask your health care provider what a healthy weight is for you.  For calorie counting to work, you will need to eat the right number of calories in a day in order to lose a healthy amount of weight per week. A dietitian can help you determine how many calories you need in a day and will give you suggestions on how to reach your calorie goal.  · A healthy amount of weight to lose per week is usually 1-2 lb (0.5-0.9 kg). This usually means that your daily calorie intake should be reduced by 500-750 calories.  · Eating 1,200 - 1,500 calories per day can help most women lose weight.  · Eating 1,500 - 1,800 calories per day can help most men lose weight.    What is my plan?  My goal is to have __________ calories per day.  If I have this many calories per day, I should lose around __________ pounds per week.  What do I need to know about calorie counting?  In order to meet your daily calorie goal, you will need to:  · Find out how many calories are in each food you would like to eat. Try to do this before you eat.  · Decide how much of the food you plan to eat.  · Write down what you ate and how many calories it had. Doing this is called keeping a food log.    To successfully lose weight, it is important to balance calorie counting with a healthy lifestyle that includes regular activity. Aim for 150 minutes of moderate exercise (such as  walking) or 75 minutes of vigorous exercise (such as running) each week.  Where do I find calorie information?    The number of calories in a food can be found on a Nutrition Facts label. If a food does not have a Nutrition Facts label, try to look up the calories online or ask your dietitian for help.  Remember that calories are listed per serving. If you choose to have more than one serving of a food, you will have to multiply the calories per serving by the amount of servings you plan to eat. For example, the label on a package of bread might say that a serving size is 1 slice and that there are 90 calories in a serving. If you eat 1 slice, you will have eaten 90 calories. If you eat 2 slices, you will have eaten 180 calories.  How do I keep a food log?  Immediately after each meal, record the following information in your food log:  · What you ate. Don't forget to include toppings, sauces, and other extras on the food.  · How much you ate. This can be measured in cups, ounces, or number of items.  · How many calories each food and drink had.  · The total number of calories in the meal.    Keep your food log near you, such as in a small notebook in your pocket, or use a mobile aleksandra or website. Some programs will calculate calories for you and show you how many calories you have left for the day to meet your goal.  What are some calorie counting tips?  · Use your calories on foods and drinks that will fill you up and not leave you hungry:  ? Some examples of foods that fill you up are nuts and nut butters, vegetables, lean proteins, and high-fiber foods like whole grains. High-fiber foods are foods with more than 5 g fiber per serving.  ? Drinks such as sodas, specialty coffee drinks, alcohol, and juices have a lot of calories, yet do not fill you up.  · Eat nutritious foods and avoid empty calories. Empty calories are calories you get from foods or beverages that do not have many vitamins or protein, such as  "candy, sweets, and soda. It is better to have a nutritious high-calorie food (such as an avocado) than a food with few nutrients (such as a bag of chips).  · Know how many calories are in the foods you eat most often. This will help you calculate calorie counts faster.  · Pay attention to calories in drinks. Low-calorie drinks include water and unsweetened drinks.  · Pay attention to nutrition labels for \"low fat\" or \"fat free\" foods. These foods sometimes have the same amount of calories or more calories than the full fat versions. They also often have added sugar, starch, or salt, to make up for flavor that was removed with the fat.  · Find a way of tracking calories that works for you. Get creative. Try different apps or programs if writing down calories does not work for you.  What are some portion control tips?  · Know how many calories are in a serving. This will help you know how many servings of a certain food you can have.  · Use a measuring cup to measure serving sizes. You could also try weighing out portions on a kitchen scale. With time, you will be able to estimate serving sizes for some foods.  · Take some time to put servings of different foods on your favorite plates, bowls, and cups so you know what a serving looks like.  · Try not to eat straight from a bag or box. Doing this can lead to overeating. Put the amount you would like to eat in a cup or on a plate to make sure you are eating the right portion.  · Use smaller plates, glasses, and bowls to prevent overeating.  · Try not to multitask (for example, watch TV or use your computer) while eating. If it is time to eat, sit down at a table and enjoy your food. This will help you to know when you are full. It will also help you to be aware of what you are eating and how much you are eating.  What are tips for following this plan?  Reading food labels  · Check the calorie count compared to the serving size. The serving size may be smaller than what " you are used to eating.  · Check the source of the calories. Make sure the food you are eating is high in vitamins and protein and low in saturated and trans fats.  Shopping  · Read nutrition labels while you shop. This will help you make healthy decisions before you decide to purchase your food.  · Make a grocery list and stick to it.  Cooking  · Try to cook your favorite foods in a healthier way. For example, try baking instead of frying.  · Use low-fat dairy products.  Meal planning  · Use more fruits and vegetables. Half of your plate should be fruits and vegetables.  · Include lean proteins like poultry and fish.  How do I count calories when eating out?  · Ask for smaller portion sizes.  · Consider sharing an entree and sides instead of getting your own entree.  · If you get your own entree, eat only half. Ask for a box at the beginning of your meal and put the rest of your entree in it so you are not tempted to eat it.  · If calories are listed on the menu, choose the lower calorie options.  · Choose dishes that include vegetables, fruits, whole grains, low-fat dairy products, and lean protein.  · Choose items that are boiled, broiled, grilled, or steamed. Stay away from items that are buttered, battered, fried, or served with cream sauce. Items labeled “crispy” are usually fried, unless stated otherwise.  · Choose water, low-fat milk, unsweetened iced tea, or other drinks without added sugar. If you want an alcoholic beverage, choose a lower calorie option such as a glass of wine or light beer.  · Ask for dressings, sauces, and syrups on the side. These are usually high in calories, so you should limit the amount you eat.  · If you want a salad, choose a garden salad and ask for grilled meats. Avoid extra toppings like barreto, cheese, or fried items. Ask for the dressing on the side, or ask for olive oil and vinegar or lemon to use as dressing.  · Estimate how many servings of a food you are given. For  example, a serving of cooked rice is ½ cup or about the size of half a baseball. Knowing serving sizes will help you be aware of how much food you are eating at restaurants. The list below tells you how big or small some common portion sizes are based on everyday objects:  ? 1 oz--4 stacked dice.  ? 3 oz--1 deck of cards.  ? 1 tsp--1 die.  ? 1 Tbsp--½ a ping-pong ball.  ? 2 Tbsp--1 ping-pong ball.  ? ½ cup--½ baseball.  ? 1 cup--1 baseball.  Summary  · Calorie counting means keeping track of how many calories you eat and drink each day. If you eat fewer calories than your body needs, you should lose weight.  · A healthy amount of weight to lose per week is usually 1-2 lb (0.5-0.9 kg). This usually means reducing your daily calorie intake by 500-750 calories.  · The number of calories in a food can be found on a Nutrition Facts label. If a food does not have a Nutrition Facts label, try to look up the calories online or ask your dietitian for help.  · Use your calories on foods and drinks that will fill you up, and not on foods and drinks that will leave you hungry.  · Use smaller plates, glasses, and bowls to prevent overeating.  This information is not intended to replace advice given to you by your health care provider. Make sure you discuss any questions you have with your health care provider.  Document Released: 12/18/2006 Document Revised: 11/17/2017 Document Reviewed: 11/17/2017  [employer] Interactive Patient Education © 2019 [employer]  BMI for Adults  Body mass index (BMI) is a number that is calculated from a person's weight and height. In most adults, the number is used to find how much of an adult's weight is made up of fat. BMI is not as accurate as a direct measure of body fat.  How is BMI calculated?  BMI is calculated by dividing weight in kilograms by height in meters squared. It can also be calculated by dividing weight in pounds by height in inches squared, then multiplying the resulting number  by 703. Charts are available to help you find your BMI quickly and easily without doing this calculation.  How is BMI interpreted?  Health care professionals use BMI charts to identify whether an adult is underweight, at a normal weight, or overweight based on the following guidelines:  · Underweight: BMI less than 18.5.  · Normal weight: BMI between 18.5 and 24.9.  · Overweight: BMI between 25 and 29.9.  · Obese: BMI of 30 and above.    BMI is usually interpreted the same for males and females.  Weight includes both fat and muscle, so someone with a muscular build, such as an athlete, may have a BMI that is higher than 24.9. In cases like these, BMI may not accurately depict body fat. To determine if excess body fat is the cause of a BMI of 25 or higher, further assessments may need to be done by a health care provider.  Why is BMI a useful tool?  BMI is used to identify a possible weight problem that may be related to a medical problem or may increase the risk for medical problems. BMI can also be used to promote changes to reach a healthy weight.  This information is not intended to replace advice given to you by your health care provider. Make sure you discuss any questions you have with your health care provider.  Document Released: 08/29/2005 Document Revised: 04/27/2017 Document Reviewed: 05/15/2015  [employer] Interactive Patient Education © 2018 [employer]

## 2019-05-20 RX ORDER — RANITIDINE 150 MG/1
TABLET ORAL
Qty: 180 TABLET | Refills: 1 | Status: SHIPPED | OUTPATIENT
Start: 2019-05-20 | End: 2019-10-22

## 2019-06-05 RX ORDER — LEVOTHYROXINE SODIUM 175 MCG
TABLET ORAL
Qty: 90 TABLET | Refills: 0 | Status: SHIPPED | OUTPATIENT
Start: 2019-06-05 | End: 2019-09-03 | Stop reason: SDUPTHER

## 2019-09-03 RX ORDER — LEVOTHYROXINE SODIUM 175 UG/1
175 TABLET ORAL DAILY
Qty: 90 TABLET | Refills: 0 | Status: SHIPPED | OUTPATIENT
Start: 2019-09-03 | End: 2019-10-22 | Stop reason: SDUPTHER

## 2019-10-22 ENCOUNTER — OFFICE VISIT (OUTPATIENT)
Dept: INTERNAL MEDICINE | Facility: CLINIC | Age: 59
End: 2019-10-22

## 2019-10-22 VITALS
OXYGEN SATURATION: 99 % | RESPIRATION RATE: 16 BRPM | HEART RATE: 67 BPM | DIASTOLIC BLOOD PRESSURE: 78 MMHG | HEIGHT: 69 IN | TEMPERATURE: 98.6 F | WEIGHT: 172.8 LBS | SYSTOLIC BLOOD PRESSURE: 130 MMHG | BODY MASS INDEX: 25.59 KG/M2

## 2019-10-22 DIAGNOSIS — E78.5 HYPERLIPIDEMIA, UNSPECIFIED HYPERLIPIDEMIA TYPE: ICD-10-CM

## 2019-10-22 DIAGNOSIS — Z12.5 SCREENING FOR PROSTATE CANCER: ICD-10-CM

## 2019-10-22 DIAGNOSIS — Z00.00 ENCOUNTER FOR PREVENTIVE HEALTH EXAMINATION: Primary | ICD-10-CM

## 2019-10-22 DIAGNOSIS — E03.9 HYPOTHYROIDISM, UNSPECIFIED TYPE: ICD-10-CM

## 2019-10-22 DIAGNOSIS — K21.9 GASTROESOPHAGEAL REFLUX DISEASE WITHOUT ESOPHAGITIS: ICD-10-CM

## 2019-10-22 PROCEDURE — 99396 PREV VISIT EST AGE 40-64: CPT | Performed by: INTERNAL MEDICINE

## 2019-10-22 RX ORDER — FAMOTIDINE 40 MG/1
40 TABLET, FILM COATED ORAL 2 TIMES DAILY
Qty: 180 TABLET | Refills: 1 | Status: SHIPPED | OUTPATIENT
Start: 2019-10-22 | End: 2020-06-09

## 2019-10-22 RX ORDER — LEVOTHYROXINE SODIUM 175 UG/1
175 TABLET ORAL DAILY
Qty: 90 TABLET | Refills: 3 | Status: SHIPPED | OUTPATIENT
Start: 2019-10-22 | End: 2021-02-17 | Stop reason: SDUPTHER

## 2019-10-22 RX ORDER — FAMOTIDINE 20 MG/1
20 TABLET, FILM COATED ORAL 2 TIMES DAILY
Qty: 180 TABLET | Refills: 3 | Status: SHIPPED | OUTPATIENT
Start: 2019-10-22 | End: 2019-10-22 | Stop reason: SDUPTHER

## 2019-10-22 NOTE — PROGRESS NOTES
Subjective   Waqas Odonnell is a 59 y.o. male.     Chief Complaint   Patient presents with   • Annual Exam         In for annual preventative exam.  He gets about 4-5 hours of sleep at night.  Feels rested.  No significant exercise other than the being very active at work.  Energy is okay.  Is been high strung.  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  Does some push-ups and curls almost every day.  Works in the [employer] for [employer].  Diet is balanced.           The following portions of the patient's history were reviewed and updated as appropriate: allergies, current medications, past social history and problem list.    HISTORY  Outpatient Medications Marked as Taking for the 10/22/19 encounter (Office Visit) with Brian Boo MD   Medication Sig Dispense Refill   • levothyroxine (SYNTHROID) 175 MCG tablet Take 1 tablet by mouth Daily. 90 tablet 3   • [DISCONTINUED] levothyroxine (SYNTHROID) 175 MCG tablet Take 1 tablet by mouth Daily. 90 tablet 0     Social History     Socioeconomic History   • Marital status:      Spouse name: Not on file   • Number of children: Not on file   • Years of education: Not on file   • Highest education level: Not on file   Tobacco Use   • Smoking status: Former Smoker     Types: Cigarettes     Start date:      Last attempt to quit:      Years since quittin.8   • Smokeless tobacco: Never Used   • Tobacco comment: Weekend smoker   Substance and Sexual Activity   • Alcohol use: Yes     Alcohol/week: 0.0 - 7.2 oz     Comment: Beer: Weekends     Family History   Problem Relation Age of Onset   • Diabetes Mother    • Heart attack Father    • No Known Problems Sister    • No Known Problems Brother    • No Known Problems Daughter    • No Known Problems Son    • No Known Problems Brother      No past medical history on file.  No past surgical history on file.    Review of Systems   Constitutional: Positive for diaphoresis. Negative for chills, fatigue  and fever.   HENT: Negative for congestion, ear pain, nosebleeds, rhinorrhea, sore throat and voice change.    Eyes: Negative for discharge, itching and visual disturbance.   Respiratory: Negative for cough, chest tightness, shortness of breath and wheezing.    Cardiovascular: Negative for chest pain, palpitations and leg swelling.   Gastrointestinal: Positive for abdominal pain (heartburn controlled with Zantac). Negative for anal bleeding, constipation, diarrhea, nausea and vomiting.   Endocrine: Negative for cold intolerance, heat intolerance and polyuria.   Genitourinary: Negative for difficulty urinating, dysuria, frequency, hematuria and urgency.   Musculoskeletal: Negative for arthralgias, back pain, myalgias and neck pain.   Skin: Negative for rash and wound.   Allergic/Immunologic: Negative for environmental allergies.   Neurological: Positive for headaches. Negative for dizziness, syncope, weakness and light-headedness.   Hematological: Negative for adenopathy. Does not bruise/bleed easily.   Psychiatric/Behavioral: Negative for confusion and sleep disturbance. The patient is nervous/anxious.        Objective   Vitals:    10/22/19 1430   BP: 130/78   Pulse: 67   Resp: 16   Temp: 98.6 °F (37 °C)   SpO2: 99%          10/22/19  1430   Weight: 78.4 kg (172 lb 12.8 oz)    [unfilled]  Body mass index is 25.51 kg/m².      Physical Exam   Constitutional: He is oriented to person, place, and time. He appears well-developed and well-nourished.   HENT:   Head: Normocephalic and atraumatic.   Right Ear: External ear normal.   Left Ear: External ear normal.   Nose: Nose normal.   Mouth/Throat: Oropharynx is clear and moist.   Eyes: Conjunctivae and EOM are normal. Pupils are equal, round, and reactive to light. No scleral icterus.   Neck: Normal range of motion. Neck supple. No JVD present. No thyromegaly present.   Cardiovascular: Normal rate, regular rhythm, normal heart sounds and intact distal pulses. Exam  reveals no gallop and no friction rub.   No murmur heard.  Pulmonary/Chest: Effort normal and breath sounds normal. No respiratory distress. He has no wheezes. He has no rales.   Abdominal: Soft. Bowel sounds are normal. He exhibits no distension and no mass. There is no tenderness. There is no rebound and no guarding. No hernia.   Genitourinary: Rectum normal, prostate normal and penis normal.   Musculoskeletal: Normal range of motion. He exhibits no edema.   Lymphadenopathy:     He has no cervical adenopathy.   Neurological: He is alert and oriented to person, place, and time. He has normal reflexes. He displays normal reflexes.   Skin: Skin is warm and dry.   Psychiatric: He has a normal mood and affect. His behavior is normal. Judgment and thought content normal.   Nursing note and vitals reviewed.        Problem List Items Addressed This Visit        Cardiovascular and Mediastinum    Hyperlipidemia       Digestive    Gastroesophageal reflux disease without esophagitis    Relevant Medications    famotidine (PEPCID) 20 MG tablet       Endocrine    Hypothyroidism    Relevant Medications    levothyroxine (SYNTHROID) 175 MCG tablet      Other Visit Diagnoses     Encounter for preventive health examination    -  Primary        Assessment/Plan   In for annual preventative exam.  Overall health is been excellent.  He has hypothyroidism on replacement.  Hyperlipidemia.  GERD not controlled on ranitidine.  He quit it when the drug was recalled.  We will switch to Pepcid and boost dose to 40 mg twice daily.  We'll plan on checking lipids every 6 months.  Annual lab today October 2019 including CBC, CMP, lipids, PSA, TSH, free T4.  Colonoscopy up-to-date at age 50.  Flu shots at work.  We'll monitor lipids in 6 month intervals.  Due for shingrix.  He's been intolerant to statins.  Tried Livalo, pravastatin, atorvastatin.  If lipids are up today, will consider trying on Zetia next.  He feels like he is hyper a lot of the  time does not want to take medicine for it.  Has some anger issues.  Would probably benefit from an SSRI.  He'll call back if he changes his mind.     Prevention counseling was performed today. The counseling performed was routine health maintenance topics.           Dragon disclaimer:   Much of this encounter note is an electronic transcription/translation of spoken language to printed text. The electronic translation of spoken language may permit erroneous, or at times, nonsensical words or phrases to be inadvertently transcribed; Although I have reviewed the note for such errors, some may still exist.

## 2019-10-23 LAB
ALBUMIN SERPL-MCNC: 5.3 G/DL (ref 3.5–5.2)
ALBUMIN/GLOB SERPL: 2.8 G/DL
ALP SERPL-CCNC: 65 U/L (ref 39–117)
ALT SERPL-CCNC: 13 U/L (ref 1–41)
AST SERPL-CCNC: 17 U/L (ref 1–40)
BASOPHILS # BLD AUTO: 0.03 10*3/MM3 (ref 0–0.2)
BASOPHILS NFR BLD AUTO: 0.5 % (ref 0–1.5)
BILIRUB SERPL-MCNC: 0.6 MG/DL (ref 0.2–1.2)
BUN SERPL-MCNC: 14 MG/DL (ref 6–20)
BUN/CREAT SERPL: 17.3 (ref 7–25)
CALCIUM SERPL-MCNC: 9.5 MG/DL (ref 8.6–10.5)
CHLORIDE SERPL-SCNC: 101 MMOL/L (ref 98–107)
CHOLEST SERPL-MCNC: 271 MG/DL (ref 0–200)
CO2 SERPL-SCNC: 28.8 MMOL/L (ref 22–29)
CREAT SERPL-MCNC: 0.81 MG/DL (ref 0.76–1.27)
EOSINOPHIL # BLD AUTO: 0.19 10*3/MM3 (ref 0–0.4)
EOSINOPHIL NFR BLD AUTO: 2.9 % (ref 0.3–6.2)
ERYTHROCYTE [DISTWIDTH] IN BLOOD BY AUTOMATED COUNT: 11.9 % (ref 12.3–15.4)
GLOBULIN SER CALC-MCNC: 1.9 GM/DL
GLUCOSE SERPL-MCNC: 100 MG/DL (ref 65–99)
HCT VFR BLD AUTO: 47.8 % (ref 37.5–51)
HDLC SERPL-MCNC: 82 MG/DL (ref 40–60)
HGB BLD-MCNC: 16.3 G/DL (ref 13–17.7)
IMM GRANULOCYTES # BLD AUTO: 0.02 10*3/MM3 (ref 0–0.05)
IMM GRANULOCYTES NFR BLD AUTO: 0.3 % (ref 0–0.5)
LDLC SERPL CALC-MCNC: 169 MG/DL (ref 0–100)
LYMPHOCYTES # BLD AUTO: 2.45 10*3/MM3 (ref 0.7–3.1)
LYMPHOCYTES NFR BLD AUTO: 37.1 % (ref 19.6–45.3)
MCH RBC QN AUTO: 30.8 PG (ref 26.6–33)
MCHC RBC AUTO-ENTMCNC: 34.1 G/DL (ref 31.5–35.7)
MCV RBC AUTO: 90.4 FL (ref 79–97)
MONOCYTES # BLD AUTO: 0.57 10*3/MM3 (ref 0.1–0.9)
MONOCYTES NFR BLD AUTO: 8.6 % (ref 5–12)
NEUTROPHILS # BLD AUTO: 3.34 10*3/MM3 (ref 1.7–7)
NEUTROPHILS NFR BLD AUTO: 50.6 % (ref 42.7–76)
NRBC BLD AUTO-RTO: 0 /100 WBC (ref 0–0.2)
PLATELET # BLD AUTO: 258 10*3/MM3 (ref 140–450)
POTASSIUM SERPL-SCNC: 4.2 MMOL/L (ref 3.5–5.2)
PROT SERPL-MCNC: 7.2 G/DL (ref 6–8.5)
PSA SERPL-MCNC: 0.34 NG/ML (ref 0–4)
RBC # BLD AUTO: 5.29 10*6/MM3 (ref 4.14–5.8)
SODIUM SERPL-SCNC: 142 MMOL/L (ref 136–145)
T4 FREE SERPL-MCNC: 1.33 NG/DL (ref 0.93–1.7)
TRIGL SERPL-MCNC: 99 MG/DL (ref 0–150)
TSH SERPL DL<=0.005 MIU/L-ACNC: 0.81 UIU/ML (ref 0.27–4.2)
VLDLC SERPL CALC-MCNC: 19.8 MG/DL
WBC # BLD AUTO: 6.6 10*3/MM3 (ref 3.4–10.8)

## 2019-10-23 RX ORDER — EZETIMIBE 10 MG/1
10 TABLET ORAL DAILY
Qty: 90 TABLET | Refills: 1 | Status: SHIPPED | OUTPATIENT
Start: 2019-10-23 | End: 2020-06-09

## 2019-10-31 RX ORDER — RANITIDINE 150 MG/1
TABLET ORAL
Qty: 180 TABLET | Refills: 1 | OUTPATIENT
Start: 2019-10-31

## 2020-06-09 RX ORDER — EZETIMIBE 10 MG/1
TABLET ORAL
Qty: 90 TABLET | Refills: 1 | Status: SHIPPED | OUTPATIENT
Start: 2020-06-09 | End: 2020-06-30 | Stop reason: SINTOL

## 2020-06-09 RX ORDER — FAMOTIDINE 40 MG/1
TABLET, FILM COATED ORAL
Qty: 180 TABLET | Refills: 1 | Status: SHIPPED | OUTPATIENT
Start: 2020-06-09 | End: 2021-02-17 | Stop reason: SDUPTHER

## 2020-06-30 ENCOUNTER — OFFICE VISIT (OUTPATIENT)
Dept: INTERNAL MEDICINE | Facility: CLINIC | Age: 60
End: 2020-06-30

## 2020-06-30 VITALS
TEMPERATURE: 97.7 F | WEIGHT: 170 LBS | SYSTOLIC BLOOD PRESSURE: 112 MMHG | BODY MASS INDEX: 25.18 KG/M2 | DIASTOLIC BLOOD PRESSURE: 70 MMHG | RESPIRATION RATE: 16 BRPM | OXYGEN SATURATION: 98 % | HEIGHT: 69 IN | HEART RATE: 83 BPM

## 2020-06-30 DIAGNOSIS — E78.5 HYPERLIPIDEMIA, UNSPECIFIED HYPERLIPIDEMIA TYPE: Primary | ICD-10-CM

## 2020-06-30 DIAGNOSIS — K21.9 GASTROESOPHAGEAL REFLUX DISEASE WITHOUT ESOPHAGITIS: ICD-10-CM

## 2020-06-30 DIAGNOSIS — E03.9 HYPOTHYROIDISM, UNSPECIFIED TYPE: ICD-10-CM

## 2020-06-30 PROCEDURE — 99213 OFFICE O/P EST LOW 20 MIN: CPT | Performed by: INTERNAL MEDICINE

## 2020-06-30 NOTE — PROGRESS NOTES
Subjective   Waqas Odonnell is a 60 y.o. male.     Chief Complaint   Patient presents with   • Hyperlipidemia   • Hypothyroidism   • Anxiety         Hyperlipidemia   This is a chronic problem. The current episode started more than 1 year ago. The problem is controlled. Recent lipid tests were reviewed and are normal. Exacerbating diseases include hypothyroidism. There are no known factors aggravating his hyperlipidemia. Current antihyperlipidemic treatment includes statins. The current treatment provides significant improvement of lipids. There are no compliance problems.  Risk factors for coronary artery disease include dyslipidemia.   Hypothyroidism   This is a chronic problem. The current episode started more than 1 year ago. The problem occurs constantly. The problem has been unchanged. Pertinent negatives include no abdominal pain, chills, fever or vomiting. Nothing aggravates the symptoms. The treatment provided significant relief.   Anxiety   Presents for follow-up visit.       Heartburn   He complains of heartburn. He reports no abdominal pain or no wheezing. This is a chronic problem. The current episode started more than 1 year ago. The problem occurs occasionally. The problem has been unchanged. The heartburn is of mild intensity. The symptoms are aggravated by certain foods and caffeine. Risk factors include caffeine use. He has tried a histamine-2 antagonist for the symptoms. The treatment provided significant relief.        The following portions of the patient's history were reviewed and updated as appropriate: allergies, current medications, past social history and problem list.    Outpatient Medications Marked as Taking for the 6/30/20 encounter (Office Visit) with Brian Boo MD   Medication Sig Dispense Refill   • famotidine (PEPCID) 40 MG tablet TAKE 1 TABLET TWICE A  tablet 1   • levothyroxine (SYNTHROID) 175 MCG tablet Take 1 tablet by mouth Daily. 90 tablet 3       Review of Systems    Constitutional: Negative for chills and fever.   Respiratory: Negative for wheezing.    Gastrointestinal: Positive for heartburn. Negative for abdominal distention, abdominal pain, constipation, diarrhea and vomiting.       Objective   Vitals:    06/30/20 1341   BP: 112/70   Pulse: 83   Resp: 16   Temp: 97.7 °F (36.5 °C)   SpO2: 98%          06/30/20  1341   Weight: 77.1 kg (170 lb)    [unfilled]  Body mass index is 25.09 kg/m².      Physical Exam   Constitutional: He appears well-developed and well-nourished. No distress.   HENT:   Head: Normocephalic and atraumatic.   Neck: Carotid bruit is not present. No thyromegaly present.   Cardiovascular: Normal rate, regular rhythm, normal heart sounds and intact distal pulses. Exam reveals no gallop.   No murmur heard.  Pulmonary/Chest: Effort normal and breath sounds normal. No respiratory distress. He has no wheezes. He has no rales.   Abdominal: Soft. Bowel sounds are normal. He exhibits no mass. There is no tenderness. There is no guarding.         Problem List Items Addressed This Visit        Cardiovascular and Mediastinum    Hyperlipidemia - Primary       Digestive    Gastroesophageal reflux disease without esophagitis       Endocrine    Hypothyroidism        Assessment/Plan   In for recheck of hyperlipidemia, GERD, and hypothyroidism.  His GERD is worse.  Requires Zantac twice daily and runs out early.  Frequently takes 3/day.  We'll boost dose to twice a day routinely.  Thyroid is under good control.  Lipids under treatment with a statin.  He gets a lipid profile every 6 months.  Annual lab work and annual exam September 2019.  He has previously failed to statins due to myalgias.  Recently had to stop his Zetia for the same reason.  Symptoms cleared up in about 2 weeks.  Currently off of treatment.  He is fasting today.  He runs a high HDL so it is not likely he really needs to pursue treatment of lipids as aggressively as he has in the past.         Pedro  disclaimer:   Much of this encounter note is an electronic transcription/translation of spoken language to printed text. The electronic translation of spoken language may permit erroneous, or at times, nonsensical words or phrases to be inadvertently transcribed; Although I have reviewed the note for such errors, some may still exist.

## 2020-07-01 DIAGNOSIS — E78.5 HYPERLIPIDEMIA, UNSPECIFIED HYPERLIPIDEMIA TYPE: Primary | ICD-10-CM

## 2020-07-01 LAB
CHOLEST SERPL-MCNC: 253 MG/DL (ref 0–200)
CHOLEST/HDLC SERPL: 2.91 {RATIO}
HDLC SERPL-MCNC: 87 MG/DL (ref 40–60)
LDLC SERPL CALC-MCNC: 153 MG/DL (ref 0–100)
TRIGL SERPL-MCNC: 67 MG/DL (ref 0–150)
VLDLC SERPL CALC-MCNC: 13.4 MG/DL

## 2020-07-23 ENCOUNTER — HOSPITAL ENCOUNTER (OUTPATIENT)
Dept: CT IMAGING | Facility: HOSPITAL | Age: 60
Discharge: HOME | End: 2020-07-23
Admitting: INTERNAL MEDICINE

## 2020-07-23 PROCEDURE — 75571 CT HRT W/O DYE W/CA TEST: CPT

## 2020-11-03 ENCOUNTER — OFFICE VISIT (OUTPATIENT)
Dept: INTERNAL MEDICINE | Facility: CLINIC | Age: 60
End: 2020-11-03

## 2020-11-03 VITALS
HEART RATE: 62 BPM | BODY MASS INDEX: 24.41 KG/M2 | DIASTOLIC BLOOD PRESSURE: 64 MMHG | WEIGHT: 164.8 LBS | HEIGHT: 69 IN | SYSTOLIC BLOOD PRESSURE: 108 MMHG | TEMPERATURE: 96.9 F | OXYGEN SATURATION: 95 % | RESPIRATION RATE: 16 BRPM

## 2020-11-03 DIAGNOSIS — Z12.5 SCREENING FOR PROSTATE CANCER: ICD-10-CM

## 2020-11-03 DIAGNOSIS — E03.9 HYPOTHYROIDISM, UNSPECIFIED TYPE: ICD-10-CM

## 2020-11-03 DIAGNOSIS — E78.5 HYPERLIPIDEMIA, UNSPECIFIED HYPERLIPIDEMIA TYPE: ICD-10-CM

## 2020-11-03 DIAGNOSIS — Z00.00 ENCOUNTER FOR PREVENTIVE HEALTH EXAMINATION: Primary | ICD-10-CM

## 2020-11-03 DIAGNOSIS — K21.9 GASTROESOPHAGEAL REFLUX DISEASE WITHOUT ESOPHAGITIS: ICD-10-CM

## 2020-11-03 DIAGNOSIS — Z12.11 SCREEN FOR COLON CANCER: ICD-10-CM

## 2020-11-03 LAB
CLARITY, POC: CLEAR
COLOR UR: YELLOW
PH UR: 6 [PH] (ref 5–8)
PROT UR STRIP-MCNC: NEGATIVE MG/DL
RBC # UR STRIP: NEGATIVE /UL
SP GR UR: 1.01 (ref 1–1.03)

## 2020-11-03 PROCEDURE — 81003 URINALYSIS AUTO W/O SCOPE: CPT | Performed by: INTERNAL MEDICINE

## 2020-11-03 PROCEDURE — 99396 PREV VISIT EST AGE 40-64: CPT | Performed by: INTERNAL MEDICINE

## 2020-11-03 NOTE — PROGRESS NOTES
Subjective   Waqas Odonnell is a 60 y.o. male.     Chief Complaint   Patient presents with   • Annual Exam         In for annual preventative exam.  He gets about 4-5 hours of sleep at night.  Feels rested.  Exercises 2 to 3 days/week.  Energy is okay.  Is been high strung.  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  Does some push-ups and curls almost every day.  Works in the [employer] for [employer].  Diet is balanced.         The following portions of the patient's history were reviewed and updated as appropriate: allergies, current medications, past social history and problem list.    HISTORY  Outpatient Medications Marked as Taking for the 11/3/20 encounter (Office Visit) with Brian Boo MD   Medication Sig Dispense Refill   • famotidine (PEPCID) 40 MG tablet TAKE 1 TABLET TWICE A  tablet 1   • levothyroxine (SYNTHROID) 175 MCG tablet Take 1 tablet by mouth Daily. 90 tablet 3     Social History     Socioeconomic History   • Marital status:      Spouse name: Not on file   • Number of children: Not on file   • Years of education: Not on file   • Highest education level: Not on file   Tobacco Use   • Smoking status: Former Smoker     Packs/day: 0.10     Types: Cigarettes     Start date:      Quit date:      Years since quittin.8   • Smokeless tobacco: Never Used   • Tobacco comment: Weekend smoker   Substance and Sexual Activity   • Alcohol use: Yes     Alcohol/week: 0.0 - 12.0 standard drinks     Frequency: 2-3 times a week     Drinks per session: 3 or 4     Binge frequency: Monthly     Comment: Beer: Weekends   • Drug use: Not Currently     Family History   Problem Relation Age of Onset   • Diabetes Mother    • Heart attack Father    • No Known Problems Sister    • No Known Problems Brother    • No Known Problems Daughter    • No Known Problems Son    • No Known Problems Brother      History reviewed. No pertinent past medical history.  History reviewed. No pertinent  surgical history.    Review of Systems   Constitutional: Negative for chills, diaphoresis, fatigue, fever and unexpected weight change.   HENT: Negative for congestion, ear pain, nosebleeds, rhinorrhea, sore throat and voice change.    Eyes: Negative for discharge, itching and visual disturbance.   Respiratory: Negative for cough, chest tightness, shortness of breath and wheezing.    Cardiovascular: Negative for chest pain, palpitations and leg swelling.   Gastrointestinal: Positive for abdominal pain (heartburn controlled with Zantac). Negative for anal bleeding, constipation, diarrhea, nausea and vomiting.   Endocrine: Negative for cold intolerance, heat intolerance and polyuria.   Genitourinary: Negative for difficulty urinating, dysuria, frequency, hematuria and urgency.   Musculoskeletal: Negative for arthralgias, back pain, myalgias and neck pain.   Skin: Negative for rash and wound.   Allergic/Immunologic: Negative for environmental allergies.   Neurological: Positive for headaches. Negative for dizziness, syncope, weakness and light-headedness.   Hematological: Negative for adenopathy. Does not bruise/bleed easily.   Psychiatric/Behavioral: Negative for confusion, dysphoric mood and sleep disturbance. The patient is nervous/anxious.        Objective   Vitals:    11/03/20 1505   BP: 108/64   Pulse: 62   Resp: 16   Temp: 96.9 °F (36.1 °C)   SpO2: 95%          11/03/20  1505   Weight: 74.8 kg (164 lb 12.8 oz)    [unfilled]  Body mass index is 24.33 kg/m².      Physical Exam   Constitutional: He is oriented to person, place, and time. He appears well-developed.   HENT:   Head: Normocephalic and atraumatic.   Right Ear: External ear normal.   Left Ear: External ear normal.   Nose: Nose normal.   Eyes: Pupils are equal, round, and reactive to light. Conjunctivae are normal. No scleral icterus.   Neck: Normal range of motion. Neck supple. No JVD present. No thyromegaly present.   Cardiovascular: Normal rate,  regular rhythm and normal heart sounds. Exam reveals no gallop and no friction rub.   No murmur heard.  Pulmonary/Chest: Effort normal and breath sounds normal. No respiratory distress. He has no wheezes. He has no rales.   Abdominal: Soft. Bowel sounds are normal. He exhibits no distension and no mass. There is no abdominal tenderness. There is no rebound and no guarding. No hernia.   Genitourinary:    Prostate, penis and rectum normal.     Musculoskeletal: Normal range of motion.   Lymphadenopathy:     He has no cervical adenopathy.   Neurological: He is alert and oriented to person, place, and time. He has normal reflexes. He displays normal reflexes.   Skin: Skin is warm and dry.   Psychiatric: His behavior is normal. Judgment and thought content normal.   Nursing note and vitals reviewed.        Problems Addressed this Visit        Cardiovascular and Mediastinum    Hyperlipidemia       Digestive    Gastroesophageal reflux disease without esophagitis       Endocrine    Hypothyroidism      Other Visit Diagnoses     Encounter for preventive health examination    -  Primary    Relevant Orders    POCT urinalysis dipstick, automated      Diagnoses       Codes Comments    Encounter for preventive health examination    -  Primary ICD-10-CM: Z00.00  ICD-9-CM: V70.0     Hyperlipidemia, unspecified hyperlipidemia type     ICD-10-CM: E78.5  ICD-9-CM: 272.4     Gastroesophageal reflux disease without esophagitis     ICD-10-CM: K21.9  ICD-9-CM: 530.81     Hypothyroidism, unspecified type     ICD-10-CM: E03.9  ICD-9-CM: 244.9         Assessment/Plan   In for annual preventative exam.  Overall health is been excellent.  He has hypothyroidism on replacement.  Hyperlipidemia.  GERD controlled on famotidine.  We'll plan on checking lipids every 6 months.  Annual lab today October 2020 including CBC, CMP, lipids, PSA, TSH, free T4.  Flu shots at work.  We'll monitor lipids in 6 month intervals.  Due for shingrix.  He's been  intolerant to statins.  Tried Livalo, pravastatin, atorvastatin.  Also failed Zetia with arm pain.  He feels like he is hyper a lot of the time does not want to take medicine for it.  Has some anger issues.  Would probably benefit from an SSRI.  He'll call back if he changes his mind.  Due for Shingrix.  Due for colonoscopy.    Prevention counseling was performed today. The counseling performed was routine health maintenance topics.    PPE today includes face mask and eye shield.         Dragon disclaimer:   Much of this encounter note is an electronic transcription/translation of spoken language to printed text. The electronic translation of spoken language may permit erroneous, or at times, nonsensical words or phrases to be inadvertently transcribed; Although I have reviewed the note for such errors, some may still exist.

## 2020-11-04 LAB
ALBUMIN SERPL-MCNC: 4.9 G/DL (ref 3.5–5.2)
ALBUMIN/GLOB SERPL: 2.9 G/DL
ALP SERPL-CCNC: 58 U/L (ref 39–117)
ALT SERPL-CCNC: 10 U/L (ref 1–41)
AST SERPL-CCNC: 9 U/L (ref 1–40)
BASOPHILS # BLD AUTO: 0.03 10*3/MM3 (ref 0–0.2)
BASOPHILS NFR BLD AUTO: 0.4 % (ref 0–1.5)
BILIRUB SERPL-MCNC: 0.9 MG/DL (ref 0–1.2)
BUN SERPL-MCNC: 11 MG/DL (ref 8–23)
BUN/CREAT SERPL: 11.8 (ref 7–25)
CALCIUM SERPL-MCNC: 9.6 MG/DL (ref 8.6–10.5)
CHLORIDE SERPL-SCNC: 103 MMOL/L (ref 98–107)
CHOLEST SERPL-MCNC: 244 MG/DL (ref 0–200)
CHOLEST/HDLC SERPL: 2.74 {RATIO}
CO2 SERPL-SCNC: 29.2 MMOL/L (ref 22–29)
CREAT SERPL-MCNC: 0.93 MG/DL (ref 0.76–1.27)
EOSINOPHIL # BLD AUTO: 0.16 10*3/MM3 (ref 0–0.4)
EOSINOPHIL NFR BLD AUTO: 1.9 % (ref 0.3–6.2)
ERYTHROCYTE [DISTWIDTH] IN BLOOD BY AUTOMATED COUNT: 12.8 % (ref 12.3–15.4)
GLOBULIN SER CALC-MCNC: 1.7 GM/DL
GLUCOSE SERPL-MCNC: 92 MG/DL (ref 65–99)
HCT VFR BLD AUTO: 45 % (ref 37.5–51)
HDLC SERPL-MCNC: 89 MG/DL (ref 40–60)
HGB BLD-MCNC: 14.8 G/DL (ref 13–17.7)
IMM GRANULOCYTES # BLD AUTO: 0.01 10*3/MM3 (ref 0–0.05)
IMM GRANULOCYTES NFR BLD AUTO: 0.1 % (ref 0–0.5)
LDLC SERPL CALC-MCNC: 140 MG/DL (ref 0–100)
LYMPHOCYTES # BLD AUTO: 2.54 10*3/MM3 (ref 0.7–3.1)
LYMPHOCYTES NFR BLD AUTO: 30.4 % (ref 19.6–45.3)
MCH RBC QN AUTO: 30.2 PG (ref 26.6–33)
MCHC RBC AUTO-ENTMCNC: 32.9 G/DL (ref 31.5–35.7)
MCV RBC AUTO: 91.8 FL (ref 79–97)
MONOCYTES # BLD AUTO: 0.66 10*3/MM3 (ref 0.1–0.9)
MONOCYTES NFR BLD AUTO: 7.9 % (ref 5–12)
NEUTROPHILS # BLD AUTO: 4.95 10*3/MM3 (ref 1.7–7)
NEUTROPHILS NFR BLD AUTO: 59.3 % (ref 42.7–76)
NRBC BLD AUTO-RTO: 0 /100 WBC (ref 0–0.2)
PLATELET # BLD AUTO: 251 10*3/MM3 (ref 140–450)
POTASSIUM SERPL-SCNC: 4.1 MMOL/L (ref 3.5–5.2)
PROT SERPL-MCNC: 6.6 G/DL (ref 6–8.5)
PSA SERPL-MCNC: 0.44 NG/ML (ref 0–4)
RBC # BLD AUTO: 4.9 10*6/MM3 (ref 4.14–5.8)
SODIUM SERPL-SCNC: 141 MMOL/L (ref 136–145)
T4 FREE SERPL-MCNC: 1.57 NG/DL (ref 0.93–1.7)
TRIGL SERPL-MCNC: 89 MG/DL (ref 0–150)
TSH SERPL DL<=0.005 MIU/L-ACNC: 0.41 UIU/ML (ref 0.27–4.2)
VLDLC SERPL CALC-MCNC: 15 MG/DL (ref 5–40)
WBC # BLD AUTO: 8.35 10*3/MM3 (ref 3.4–10.8)

## 2021-02-17 RX ORDER — LEVOTHYROXINE SODIUM 175 MCG
175 TABLET ORAL DAILY
Qty: 90 TABLET | Refills: 3 | Status: SHIPPED | OUTPATIENT
Start: 2021-02-17 | End: 2021-02-19

## 2021-02-17 RX ORDER — FAMOTIDINE 40 MG/1
40 TABLET, FILM COATED ORAL 2 TIMES DAILY
Qty: 180 TABLET | Refills: 1 | Status: SHIPPED | OUTPATIENT
Start: 2021-02-17 | End: 2021-11-04 | Stop reason: SDUPTHER

## 2021-02-17 NOTE — TELEPHONE ENCOUNTER
Caller: Waqas Odonnell    Relationship: Self    Best call back number: [phone]    Medication needed:   Requested Prescriptions     Pending Prescriptions Disp Refills   • levothyroxine (Synthroid) 175 MCG tablet 90 tablet 3     Sig: Take 1 tablet by mouth Daily.   • famotidine (PEPCID) 40 MG tablet 180 tablet 1     Sig: Take 1 tablet by mouth 2 (Two) Times a Day.       When do you need the refill by: A WEEKS     What details did the patient provide when requesting the medication: [facility] DOESN'T WANT TO RENEW PATIENT'S SYNTHROID MEDICATION.  THEY WANT HIM TO GO ON A GENERIC FORM AND HE DOESN'T WANT THE GENERIC.    PLEASE ADVISE  Does the patient have less than a 3 day supply:  [] Yes  [x] No    What is the patient's preferred pharmacy: [facility] - [place], [place] - [address] AT PORTAL TO [facility] - [phone]  - [phone] FX

## 2021-02-17 NOTE — TELEPHONE ENCOUNTER
Pt states he has been taking for a long term around 10 years or even longer. Pt states he has been stable on this brand & dose. Pt requests a refill & attempt coverage. If denied please call after 3pm.

## 2021-02-17 NOTE — TELEPHONE ENCOUNTER
Dr Boo approved brand. If script gets denied we can also let patient know that he should try GoodRx to see if he can find a cheaper cost for Brand.

## 2021-02-19 RX ORDER — LEVOTHYROXINE SODIUM 175 UG/1
175 TABLET ORAL DAILY
Qty: 90 TABLET | Refills: 3 | Status: SHIPPED | OUTPATIENT
Start: 2021-02-19 | End: 2022-03-01

## 2021-02-19 NOTE — TELEPHONE ENCOUNTER
[facility] is recommending that generics be tried first before covering brand, or determining brand is required due to medical necessity.

## 2021-02-23 ENCOUNTER — TELEPHONE (OUTPATIENT)
Dept: INTERNAL MEDICINE | Facility: CLINIC | Age: 61
End: 2021-02-23

## 2021-02-23 DIAGNOSIS — Z12.11 SCREEN FOR COLON CANCER: Primary | ICD-10-CM

## 2021-02-23 RX ORDER — LEVOCETIRIZINE DIHYDROCHLORIDE 5 MG/1
5 TABLET, FILM COATED ORAL DAILY
Qty: 90 TABLET | Refills: 3 | Status: SHIPPED | OUTPATIENT
Start: 2021-02-23 | End: 2021-05-04 | Stop reason: SDUPTHER

## 2021-02-23 NOTE — TELEPHONE ENCOUNTER
Informed patient. He will try Xyzal. He prefers Cologuard. He does not recall of hx of polyps. No rectal bleeding, anemia or family history of cancer. Patient will contact his insurance company to verify Cologuard is a covered benefit.

## 2021-02-23 NOTE — TELEPHONE ENCOUNTER
GI referral was placed for Dr. Mooney at his last visit in November. Patient is now asking if he meets criteria for Cologuard? Also, he has a constant post nasal drip throughout the year. This has been an ongoing issue for quite sometime. What would you recommend to help clear this up?

## 2021-02-23 NOTE — TELEPHONE ENCOUNTER
PATIENT STATES: that he would like a call back to set up a colonoscopy or a take home kit please advise      PATIENT CAN BE REACHED ON:[phone]

## 2021-02-23 NOTE — TELEPHONE ENCOUNTER
I would take Xyzol 5 mg daily #90x3 for chronic allergies.  I did not understand his question as to whether he would still meet the criteria for Cologuard.  Typically high risk people like those with a history of polyps or cancer or family history of cancer should have the colonoscopy.  Also if he was anemic or having rectal bleeding.  I would recommend a colonoscopy.  Otherwise it is his choice.

## 2021-03-18 ENCOUNTER — TELEPHONE (OUTPATIENT)
Dept: INTERNAL MEDICINE | Facility: CLINIC | Age: 61
End: 2021-03-18

## 2021-03-18 NOTE — TELEPHONE ENCOUNTER
Beatriz Verification  Name: Bhavya  Ref: [id number]  Coverage: Covered 100%, no copy, no deductible. Lab in network.   HRA Balance: $634.04  Order: Faxed

## 2021-05-04 ENCOUNTER — OFFICE VISIT (OUTPATIENT)
Dept: INTERNAL MEDICINE | Facility: CLINIC | Age: 61
End: 2021-05-04

## 2021-05-04 VITALS
SYSTOLIC BLOOD PRESSURE: 128 MMHG | BODY MASS INDEX: 24.14 KG/M2 | WEIGHT: 163 LBS | HEIGHT: 69 IN | RESPIRATION RATE: 16 BRPM | DIASTOLIC BLOOD PRESSURE: 78 MMHG | HEART RATE: 64 BPM | TEMPERATURE: 97.5 F | OXYGEN SATURATION: 99 %

## 2021-05-04 DIAGNOSIS — E78.5 HYPERLIPIDEMIA, UNSPECIFIED HYPERLIPIDEMIA TYPE: Primary | ICD-10-CM

## 2021-05-04 DIAGNOSIS — E03.9 HYPOTHYROIDISM, UNSPECIFIED TYPE: ICD-10-CM

## 2021-05-04 DIAGNOSIS — K21.9 GASTROESOPHAGEAL REFLUX DISEASE WITHOUT ESOPHAGITIS: ICD-10-CM

## 2021-05-04 PROCEDURE — 99213 OFFICE O/P EST LOW 20 MIN: CPT | Performed by: INTERNAL MEDICINE

## 2021-05-04 RX ORDER — LEVOCETIRIZINE DIHYDROCHLORIDE 5 MG/1
5 TABLET, FILM COATED ORAL DAILY
Qty: 90 TABLET | Refills: 3 | Status: SHIPPED | OUTPATIENT
Start: 2021-05-04 | End: 2022-05-03

## 2021-05-04 NOTE — PROGRESS NOTES
Subjective   Waqas Odonnell is a 60 y.o. male.     Chief Complaint   Patient presents with   • Hyperlipidemia   • Hypothyroidism         Hyperlipidemia  This is a chronic problem. The current episode started more than 1 year ago. The problem is controlled. Recent lipid tests were reviewed and are normal. Exacerbating diseases include hypothyroidism. There are no known factors aggravating his hyperlipidemia. Pertinent negatives include no shortness of breath. Current antihyperlipidemic treatment includes statins. The current treatment provides significant improvement of lipids. There are no compliance problems.  Risk factors for coronary artery disease include dyslipidemia.   Hypothyroidism  This is a chronic problem. The current episode started more than 1 year ago. The problem occurs constantly. The problem has been unchanged. Nothing aggravates the symptoms. The treatment provided significant relief.   Anxiety  Presents for follow-up visit. Patient reports no shortness of breath.       Heartburn  He complains of heartburn. He reports no wheezing. This is a chronic problem. The current episode started more than 1 year ago. The problem occurs occasionally. The problem has been unchanged. The heartburn is of mild intensity. The symptoms are aggravated by certain foods and caffeine. Risk factors include caffeine use. He has tried a histamine-2 antagonist for the symptoms. The treatment provided significant relief.        The following portions of the patient's history were reviewed and updated as appropriate: allergies, current medications, past social history and problem list.    Outpatient Medications Marked as Taking for the 5/4/21 encounter (Office Visit) with Brian Boo MD   Medication Sig Dispense Refill   • famotidine (PEPCID) 40 MG tablet Take 1 tablet by mouth 2 (Two) Times a Day. 180 tablet 1   • levocetirizine (Xyzal) 5 MG tablet Take 1 tablet by mouth Daily. 90 tablet 3   • levothyroxine (SYNTHROID,  LEVOTHROID) 175 MCG tablet Take 1 tablet by mouth Daily. 90 tablet 3   • [DISCONTINUED] levocetirizine (Xyzal) 5 MG tablet Take 1 tablet by mouth Daily. 90 tablet 3       Review of Systems   Respiratory: Negative for shortness of breath and wheezing.    Gastrointestinal: Positive for heartburn. Negative for abdominal distention, constipation and diarrhea.   Endocrine: Positive for cold intolerance.       Objective   Vitals:    05/04/21 1509   BP: 128/78   Pulse: 64   Resp: 16   Temp: 97.5 °F (36.4 °C)   SpO2: 99%          05/04/21  1509   Weight: 73.9 kg (163 lb)    [unfilled]  Body mass index is 24.06 kg/m².      Physical Exam   Constitutional: He appears well-developed.   Neck: No thyromegaly present.   Cardiovascular: Normal rate, regular rhythm and normal heart sounds. Exam reveals no gallop.   No murmur heard.  Pulmonary/Chest: Effort normal and breath sounds normal. No respiratory distress. He has no wheezes. He has no rales.   Abdominal: Soft. Normal appearance and bowel sounds are normal. He exhibits no mass. There is no abdominal tenderness. There is no guarding.   Neurological: He is alert.         Problems Addressed this Visit        Cardiac and Vasculature    Hyperlipidemia - Primary (Chronic)       Endocrine and Metabolic    Hypothyroidism (Chronic)       Gastrointestinal Abdominal     Gastroesophageal reflux disease without esophagitis (Chronic)      Diagnoses       Codes Comments    Hyperlipidemia, unspecified hyperlipidemia type    -  Primary ICD-10-CM: E78.5  ICD-9-CM: 272.4     Hypothyroidism, unspecified type     ICD-10-CM: E03.9  ICD-9-CM: 244.9     Gastroesophageal reflux disease without esophagitis     ICD-10-CM: K21.9  ICD-9-CM: 530.81         Assessment/Plan   In for recheck of hyperlipidemia, GERD, and hypothyroidism.  His GERD is worse.  Requires Zantac twice daily.  Thyroid is under good control.  Lipids had been under treatment with a statin.  Annual lab work and annual exam November  2021.  He has previously failed to statins due to myalgias.  Recently had to stop his Zetia for the same reason.  Symptoms cleared up in about 2 weeks.  Currently off of treatment.  He is fasting today.  He runs a high HDL so it is not likely he really needs to pursue treatment of lipids as aggressively as he has in the past.  He is 9 HPP today.    The above information was reviewed again today 05/04/21.  It continues to be accurate as reflected above and is unchanged.  History, physical and review of systems all reviewed and are unchanged.  Medications were reviewed today and continue the current dosing.    PPE today includes face mask and eye shield.           Dragon disclaimer:   Much of this encounter note is an electronic transcription/translation of spoken language to printed text. The electronic translation of spoken language may permit erroneous, or at times, nonsensical words or phrases to be inadvertently transcribed; Although I have reviewed the note for such errors, some may still exist.

## 2021-05-05 LAB
CHOLEST SERPL-MCNC: 277 MG/DL (ref 0–200)
CHOLEST/HDLC SERPL: 2.74 {RATIO}
HDLC SERPL-MCNC: 101 MG/DL (ref 40–60)
LDLC SERPL CALC-MCNC: 165 MG/DL (ref 0–100)
TRIGL SERPL-MCNC: 71 MG/DL (ref 0–150)
VLDLC SERPL CALC-MCNC: 11 MG/DL (ref 5–40)

## 2021-11-04 ENCOUNTER — OFFICE VISIT (OUTPATIENT)
Dept: INTERNAL MEDICINE | Facility: CLINIC | Age: 61
End: 2021-11-04

## 2021-11-04 VITALS
RESPIRATION RATE: 16 BRPM | OXYGEN SATURATION: 98 % | TEMPERATURE: 97.3 F | DIASTOLIC BLOOD PRESSURE: 86 MMHG | BODY MASS INDEX: 24.73 KG/M2 | HEART RATE: 53 BPM | HEIGHT: 69 IN | SYSTOLIC BLOOD PRESSURE: 116 MMHG | WEIGHT: 167 LBS

## 2021-11-04 DIAGNOSIS — E78.5 HYPERLIPIDEMIA, UNSPECIFIED HYPERLIPIDEMIA TYPE: Chronic | ICD-10-CM

## 2021-11-04 DIAGNOSIS — I49.3 FREQUENT PVCS: ICD-10-CM

## 2021-11-04 DIAGNOSIS — E03.9 HYPOTHYROIDISM, UNSPECIFIED TYPE: Chronic | ICD-10-CM

## 2021-11-04 DIAGNOSIS — Z12.5 SCREENING FOR PROSTATE CANCER: ICD-10-CM

## 2021-11-04 DIAGNOSIS — Z23 NEED FOR VACCINATION: ICD-10-CM

## 2021-11-04 DIAGNOSIS — Z00.00 ENCOUNTER FOR PREVENTIVE HEALTH EXAMINATION: Primary | ICD-10-CM

## 2021-11-04 LAB
CLARITY, POC: CLEAR
COLOR UR: YELLOW
EXPIRATION DATE: NORMAL
Lab: NORMAL
PH UR: 6.5 [PH] (ref 5–8)
PROT UR STRIP-MCNC: NEGATIVE MG/DL
RBC # UR STRIP: NEGATIVE /UL
SP GR UR: 1.01 (ref 1–1.03)

## 2021-11-04 PROCEDURE — 99396 PREV VISIT EST AGE 40-64: CPT | Performed by: INTERNAL MEDICINE

## 2021-11-04 PROCEDURE — 90471 IMMUNIZATION ADMIN: CPT | Performed by: INTERNAL MEDICINE

## 2021-11-04 PROCEDURE — 81003 URINALYSIS AUTO W/O SCOPE: CPT | Performed by: INTERNAL MEDICINE

## 2021-11-04 PROCEDURE — 93000 ELECTROCARDIOGRAM COMPLETE: CPT | Performed by: INTERNAL MEDICINE

## 2021-11-04 PROCEDURE — 90686 IIV4 VACC NO PRSV 0.5 ML IM: CPT | Performed by: INTERNAL MEDICINE

## 2021-11-04 RX ORDER — SERTRALINE HYDROCHLORIDE 25 MG/1
25 TABLET, FILM COATED ORAL DAILY
Qty: 90 TABLET | Refills: 1 | Status: SHIPPED | OUTPATIENT
Start: 2021-11-04 | End: 2022-05-03

## 2021-11-04 RX ORDER — FAMOTIDINE 40 MG/1
40 TABLET, FILM COATED ORAL 2 TIMES DAILY
Qty: 180 TABLET | Refills: 3 | Status: SHIPPED | OUTPATIENT
Start: 2021-11-04 | End: 2022-12-01 | Stop reason: SDUPTHER

## 2021-11-04 NOTE — PROGRESS NOTES
Subjective   Waqas Odonnell is a 61 y.o. male.     Chief Complaint   Patient presents with   • Annual Exam   • Rapid Heart Rate     off & on for last couple weeks         In for annual preventative exam.  He gets about 5.5 hours of sleep at night.  Interrupted.  Feels rested.  Exercises 7 days/week.  Energy is okay.  Is been high strung.  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  Does some push-ups and curls almost every day.  Works in the [employer] for [employer].  Diet is balanced.  Past 3 weeks he has had some palpitations.  Last up to a few minutes.       The following portions of the patient's history were reviewed and updated as appropriate: allergies, current medications, past social history and problem list.    HISTORY  Outpatient Medications Marked as Taking for the 21 encounter (Office Visit) with Brian Boo MD   Medication Sig Dispense Refill   • famotidine (PEPCID) 40 MG tablet Take 1 tablet by mouth 2 (Two) Times a Day. 180 tablet 3   • levocetirizine (Xyzal) 5 MG tablet Take 1 tablet by mouth Daily. 90 tablet 3   • levothyroxine (SYNTHROID, LEVOTHROID) 175 MCG tablet Take 1 tablet by mouth Daily. 90 tablet 3   • [DISCONTINUED] famotidine (PEPCID) 40 MG tablet Take 1 tablet by mouth 2 (Two) Times a Day. 180 tablet 1     Social History     Socioeconomic History   • Marital status:    Tobacco Use   • Smoking status: Former Smoker     Packs/day: 0.10     Types: Cigarettes     Start date:      Quit date:      Years since quittin.8   • Smokeless tobacco: Never Used   • Tobacco comment: Weekend smoker   Substance and Sexual Activity   • Alcohol use: Yes     Alcohol/week: 12.0 standard drinks     Types: 12 Cans of beer per week     Comment: Beer: Weekends   • Drug use: Not Currently     Family History   Problem Relation Age of Onset   • Diabetes Mother    • Heart attack Father    • No Known Problems Sister    • No Known Problems Brother    • No Known Problems  Daughter    • No Known Problems Son    • No Known Problems Brother      No past medical history on file.  No past surgical history on file.    Review of Systems   Constitutional: Negative for chills, diaphoresis, fatigue, fever and unexpected weight change.   Respiratory: Negative for cough, chest tightness, shortness of breath and wheezing.    Cardiovascular: Positive for palpitations. Negative for chest pain and leg swelling.   Gastrointestinal: Positive for abdominal pain (heartburn controlled with Zantac). Negative for anal bleeding, blood in stool, constipation, diarrhea, nausea, rectal pain and vomiting.   Endocrine: Positive for cold intolerance. Negative for heat intolerance and polyuria.   Genitourinary: Negative for difficulty urinating, dysuria, frequency, hematuria and urgency.   Musculoskeletal: Negative for arthralgias, back pain, myalgias and neck pain.   Allergic/Immunologic: Negative for environmental allergies.   Neurological: Negative for dizziness, syncope, weakness, light-headedness and headaches.   Hematological: Negative for adenopathy. Does not bruise/bleed easily.   Psychiatric/Behavioral: Positive for dysphoric mood. Negative for confusion and sleep disturbance. The patient is nervous/anxious.        Objective   Vitals:    11/04/21 1548   BP: 116/86   Pulse: 53   Resp: 16   Temp: 97.3 °F (36.3 °C)   SpO2: 98%          11/04/21  1548   Weight: 75.8 kg (167 lb) (Steel toe boots)    [unfilled]  Body mass index is 24.65 kg/m².      Physical Exam   Constitutional: He is oriented to person, place, and time. He appears well-developed.   HENT:   Head: Normocephalic and atraumatic.   Right Ear: External ear normal.   Left Ear: External ear normal.   Nose: Nose normal.   Eyes: Pupils are equal, round, and reactive to light. Conjunctivae are normal. No scleral icterus.   Neck: No JVD present. No thyromegaly present.   Cardiovascular: Normal rate and normal heart sounds. An irregular rhythm  present. Exam reveals no gallop and no friction rub.   No murmur heard.  Pulmonary/Chest: Effort normal and breath sounds normal. No respiratory distress. He has no wheezes. He has no rales.   Abdominal: Soft. Normal appearance and bowel sounds are normal. He exhibits no distension and no mass. There is no abdominal tenderness. There is no rebound and no guarding. No hernia.   Musculoskeletal: Normal range of motion.   Lymphadenopathy:     He has no cervical adenopathy.   Neurological: He is alert and oriented to person, place, and time. He has normal reflexes. He displays normal reflexes.   Skin: Skin is warm and dry.   Psychiatric: His behavior is normal. Mood, judgment and thought content normal.   Nursing note and vitals reviewed.      ECG 12 Lead    Date/Time: 11/4/2021 4:57 PM  Performed by: Brian Boo MD  Authorized by: Brian Boo MD   Comparison: not compared with previous ECG   Previous ECG: no previous ECG available  Rhythm: sinus rhythm  Ectopy: unifocal PVCs  Rate: normal  Conduction: conduction normal  ST Segments: ST segments normal  T Waves: T waves normal  QRS axis: normal  Other: no other findings    Clinical impression: abnormal EKG  Comments: Normal sinus rhythm.  Heart rate is 79.  There are frequent unifocal PVCs.  This is an abnormal EKG.  There is no prior EKG available for comparison.                  Problems Addressed this Visit        Cardiac and Vasculature    Hyperlipidemia (Chronic)       Endocrine and Metabolic    Hypothyroidism (Chronic)      Other Visit Diagnoses     Encounter for preventive health examination    -  Primary    Need for vaccination        Relevant Orders    FluLaval/Fluarix/Fluzone >6 Months (2402-2434) (Completed)      Diagnoses       Codes Comments    Encounter for preventive health examination    -  Primary ICD-10-CM: Z00.00  ICD-9-CM: V70.0     Need for vaccination     ICD-10-CM: Z23  ICD-9-CM: V05.9     Hyperlipidemia, unspecified hyperlipidemia type      ICD-10-CM: E78.5  ICD-9-CM: 272.4     Hypothyroidism, unspecified type     ICD-10-CM: E03.9  ICD-9-CM: 244.9         Assessment/Plan   In for annual preventative exam.  Overall health is been excellent.  He has hypothyroidism on replacement.  Hyperlipidemia.  GERD controlled on famotidine.  We'll plan on checking lipids every 6 months.  Annual lab today November 2021 including CBC, CMP, lipids, PSA, TSH, free T4.  Flu shots at work.  We'll monitor lipids in 6 month intervals.  Due for shingrix.  He's been intolerant to statins.  Tried Livalo, pravastatin, atorvastatin.  Also failed Zetia with arm pain.  He feels like he is hyper a lot of the time does not want to take medicine for it.  Has some anger issues.  Took care of his elderly mother was Alzheimer's disease at end-of-life and now his father is also about Alzheimer's disease at end-of-life.  Living with father.  Would probably benefit from an SSRI.  He has refused in the past but willing to take some today.  He is pretty worked up and emotional today.  Due for Shingrix.  He declines rectal exam today.  He has been having some palpitations in last 2 weeks and has frequent PVCs on his EKG which is likely the culprit.  He declines rectal exam today.  Just wants a PSA.  He is fasting today.    Prevention counseling was performed today. The counseling performed was routine health maintenance topics including BMI and exercise.    The above information was reviewed again today 11/04/21.  It continues to be accurate as reflected above and is unchanged.  History, physical and review of systems all reviewed and are unchanged.  Medications were reviewed today and continue the current dosing.    PPE today includes face mask and eye shield.         Dragon disclaimer:   Much of this encounter note is an electronic transcription/translation of spoken language to printed text. The electronic translation of spoken language may permit erroneous, or at times, nonsensical words  or phrases to be inadvertently transcribed; Although I have reviewed the note for such errors, some may still exist.

## 2021-11-04 NOTE — PATIENT INSTRUCTIONS
Recombinant Zoster (Shingles) Vaccine: What You Need to Know  1. Why get vaccinated?  Recombinant zoster (shingles) vaccine can prevent shingles.  Shingles (also called herpes zoster, or just zoster) is a painful skin rash, usually with blisters. In addition to the rash, shingles can cause fever, headache, chills, or upset stomach. More rarely, shingles can lead to pneumonia, hearing problems, blindness, brain inflammation (encephalitis), or death.  The most common complication of shingles is long-term nerve pain called postherpetic neuralgia (PHN). PHN occurs in the areas where the shingles rash was, even after the rash clears up. It can last for months or years after the rash goes away. The pain from PHN can be severe and debilitating.  About 10 to 18% of people who get shingles will experience PHN. The risk of PHN increases with age. An older adult with shingles is more likely to develop PHN and have longer lasting and more severe pain than a younger person with shingles.  Shingles is caused by the varicella zoster virus, the same virus that causes chickenpox. After you have chickenpox, the virus stays in your body and can cause shingles later in life. Shingles cannot be passed from one person to another, but the virus that causes shingles can spread and cause chickenpox in someone who had never had chickenpox or received chickenpox vaccine.  2. Recombinant shingles vaccine  Recombinant shingles vaccine provides strong protection against shingles. By preventing shingles, recombinant shingles vaccine also protects against PHN.  Recombinant shingles vaccine is the preferred vaccine for the prevention of shingles. However, a different vaccine, live shingles vaccine, may be used in some circumstances.  The recombinant shingles vaccine is recommended for adults 50 years and older without serious immune problems. It is given as a two-dose series.  This vaccine is also recommended for people who have already gotten  another type of shingles vaccine, the live shingles vaccine. There is no live virus in this vaccine.  Shingles vaccine may be given at the same time as other vaccines.  3. Talk with your health care provider  Tell your vaccine provider if the person getting the vaccine:  · Has had an allergic reaction after a previous dose of recombinant shingles vaccine, or has any severe, life-threatening allergies.  · Is pregnant or breastfeeding.  · Is currently experiencing an episode of shingles.  In some cases, your health care provider may decide to postpone shingles vaccination to a future visit.  People with minor illnesses, such as a cold, may be vaccinated. People who are moderately or severely ill should usually wait until they recover before getting recombinant shingles vaccine.  Your health care provider can give you more information.  4. Risks of a vaccine reaction  · A sore arm with mild or moderate pain is very common after recombinant shingles vaccine, affecting about 80% of vaccinated people. Redness and swelling can also happen at the site of the injection.  · Tiredness, muscle pain, headache, shivering, fever, stomach pain, and nausea happen after vaccination in more than half of people who receive recombinant shingles vaccine.  In clinical trials, about 1 out of 6 people who got recombinant zoster vaccine experienced side effects that prevented them from doing regular activities. Symptoms usually went away on their own in 2 to 3 days.  You should still get the second dose of recombinant zoster vaccine even if you had one of these reactions after the first dose.  People sometimes faint after medical procedures, including vaccination. Tell your provider if you feel dizzy or have vision changes or ringing in the ears.  As with any medicine, there is a very remote chance of a vaccine causing a severe allergic reaction, other serious injury, or death.  5. What if there is a serious problem?  An allergic reaction  could occur after the vaccinated person leaves the clinic. If you see signs of a severe allergic reaction (hives, swelling of the face and throat, difficulty breathing, a fast heartbeat, dizziness, or weakness), call 9-1-1 and get the person to the nearest hospital.  For other signs that concern you, call your health care provider.  Adverse reactions should be reported to the Vaccine Adverse Event Reporting System (VAERS). Your health care provider will usually file this report, or you can do it yourself. Visit the VAERS website at www.vaers.[facility].gov or call 1-771.674.8160. VAERS is only for reporting reactions, and VAERS staff do not give medical advice.  6. How can I learn more?  · Ask your health care provider.  · Call your local or state health department.  · Contact the Centers for Disease Control and Prevention (CDC):  ? Call 1-624.677.2112 (1-835-ANX-INFO) or  ? Visit CDC's website at www.cdc.gov/vaccines  Vaccine Information Statement Recombinant Zoster Vaccine (10/30/2019)  This information is not intended to replace advice given to you by your health care provider. Make sure you discuss any questions you have with your health care provider.  Document Revised: 12/09/2020 Document Reviewed: 12/09/2020  [employer] Patient Education © 2021 [employer]  Influenza (Flu) Vaccine (Inactivated or Recombinant): What You Need to Know  1. Why get vaccinated?  Influenza vaccine can prevent influenza (flu).  Flu is a contagious disease that spreads around the [place] every year, usually between October and May. Anyone can get the flu, but it is more dangerous for some people. Infants and young children, people 65 years of age and older, pregnant women, and people with certain health conditions or a weakened immune system are at greatest risk of flu complications.  Pneumonia, bronchitis, sinus infections and ear infections are examples of flu-related complications. If you have a medical condition, such as heart  disease, cancer or diabetes, flu can make it worse.  Flu can cause fever and chills, sore throat, muscle aches, fatigue, cough, headache, and runny or stuffy nose. Some people may have vomiting and diarrhea, though this is more common in children than adults.  Each year thousands of people in the [place] die from flu, and many more are hospitalized. Flu vaccine prevents millions of illnesses and flu-related visits to the doctor each year.  2. Influenza vaccine  CDC recommends everyone 6 months of age and older get vaccinated every flu season. Children 6 months through 8 years of age may need 2 doses during a single flu season. Everyone else needs only 1 dose each flu season.  It takes about 2 weeks for protection to develop after vaccination.  There are many flu viruses, and they are always changing. Each year a new flu vaccine is made to protect against three or four viruses that are likely to cause disease in the upcoming flu season. Even when the vaccine doesn't exactly match these viruses, it may still provide some protection.  Influenza vaccine does not cause flu.  Influenza vaccine may be given at the same time as other vaccines.  3. Talk with your health care provider  Tell your vaccine provider if the person getting the vaccine:  · Has had an allergic reaction after a previous dose of influenza vaccine, or has any severe, life-threatening allergies.  · Has ever had Guillain-Barré Syndrome (also called GBS).  In some cases, your health care provider may decide to postpone influenza vaccination to a future visit.  People with minor illnesses, such as a cold, may be vaccinated. People who are moderately or severely ill should usually wait until they recover before getting influenza vaccine.  Your health care provider can give you more information.  4. Risks of a vaccine reaction  · Soreness, redness, and swelling where shot is given, fever, muscle aches, and headache can happen after influenza  vaccine.  · There may be a very small increased risk of Guillain-Barré Syndrome (GBS) after inactivated influenza vaccine (the flu shot).  Young children who get the flu shot along with pneumococcal vaccine (PCV13), and/or DTaP vaccine at the same time might be slightly more likely to have a seizure caused by fever. Tell your health care provider if a child who is getting flu vaccine has ever had a seizure.  People sometimes faint after medical procedures, including vaccination. Tell your provider if you feel dizzy or have vision changes or ringing in the ears.  As with any medicine, there is a very remote chance of a vaccine causing a severe allergic reaction, other serious injury, or death.  5. What if there is a serious problem?  An allergic reaction could occur after the vaccinated person leaves the clinic. If you see signs of a severe allergic reaction (hives, swelling of the face and throat, difficulty breathing, a fast heartbeat, dizziness, or weakness), call 9-1-1 and get the person to the nearest hospital.  For other signs that concern you, call your health care provider.  Adverse reactions should be reported to the Vaccine Adverse Event Reporting System (VAERS). Your health care provider will usually file this report, or you can do it yourself. Visit the VAERS website at www.vaers.hhs.gov or call 1-302.434.8044. VAERS is only for reporting reactions, and VAERS staff do not give medical advice.  6. The National Vaccine Injury Compensation Program  The National Vaccine Injury Compensation Program (VICP) is a federal program that was created to compensate people who may have been injured by certain vaccines. Visit the VICP website at www.hrsa.gov/vaccinecompensation or call 1-553.809.4944 to learn about the program and about filing a claim. There is a time limit to file a claim for compensation.  7. How can I learn more?  · Ask your healthcare provider.  · Call your local or [facility]  department.  · Contact the Centers for Disease Control and Prevention (CDC):  ? Call 1-125.791.6559 (8-447-BUY-INFO) or  ? Visit CDC's www.cdc.gov/flu  Vaccine Information Statement (Interim) Inactivated Influenza Vaccine (8/15/2019)  This information is not intended to replace advice given to you by your health care provider. Make sure you discuss any questions you have with your health care provider.  Document Revised: 12/09/2020 Document Reviewed: 12/09/2020  [employer] Patient Education © 2021 [employer]  Exercising to Stay Healthy  To become healthy and stay healthy, it is recommended that you do moderate-intensity and vigorous-intensity exercise. You can tell that you are exercising at a moderate intensity if your heart starts beating faster and you start breathing faster but can still hold a conversation. You can tell that you are exercising at a vigorous intensity if you are breathing much harder and faster and cannot hold a conversation while exercising.  Exercising regularly is important. It has many health benefits, such as:  · Improving overall fitness, flexibility, and endurance.  · Increasing bone density.  · Helping with weight control.  · Decreasing body fat.  · Increasing muscle strength.  · Reducing stress and tension.  · Improving overall health.  How often should I exercise?  Choose an activity that you enjoy, and set realistic goals. Your health care provider can help you make an activity plan that works for you.  Exercise regularly as told by your health care provider. This may include:  · Doing strength training two times a week, such as:  ? Lifting weights.  ? Using resistance bands.  ? Push-ups.  ? Sit-ups.  ? Yoga.  · Doing a certain intensity of exercise for a given amount of time. Choose from these options:  ? A total of 150 minutes of moderate-intensity exercise every week.  ? A total of 75 minutes of vigorous-intensity exercise every week.  ? A mix of moderate-intensity and  vigorous-intensity exercise every week.  Children, pregnant women, people who have not exercised regularly, people who are overweight, and older adults may need to talk with a health care provider about what activities are safe to do. If you have a medical condition, be sure to talk with your health care provider before you start a new exercise program.  What are some exercise ideas?  Moderate-intensity exercise ideas include:  · Walking 1 mile (1.6 km) in about 15 minutes.  · Biking.  · Hiking.  · Golfing.  · Dancing.  · Water aerobics.  Vigorous-intensity exercise ideas include:  · Walking 4.5 miles (7.2 km) or more in about 1 hour.  · Jogging or running 5 miles (8 km) in about 1 hour.  · Biking 10 miles (16.1 km) or more in about 1 hour.  · Lap swimming.  · Roller-skating or in-line skating.  · Cross-country skiing.  · Vigorous competitive sports, such as football, basketball, and soccer.  · Jumping rope.  · Aerobic dancing.  What are some everyday activities that can help me to get exercise?  · Yard work, such as:  ? Pushing a .  ? Raking and bagging leaves.  · Washing your car.  · Pushing a stroller.  · Shoveling snow.  · Gardening.  · Washing windows or floors.  How can I be more active in my day-to-day activities?  · Use stairs instead of an elevator.  · Take a walk during your lunch break.  · If you drive, park your car farther away from your work or school.  · If you take public transportation, get off one stop early and walk the rest of the way.  · Stand up or walk around during all of your indoor phone calls.  · Get up, stretch, and walk around every 30 minutes throughout the day.  · Enjoy exercise with a friend. Support to continue exercising will help you keep a regular routine of activity.  What guidelines can I follow while exercising?  · Before you start a new exercise program, talk with your health care provider.  · Do not exercise so much that you hurt yourself, feel dizzy, or get very  short of breath.  · Wear comfortable clothes and wear shoes with good support.  · Drink plenty of water while you exercise to prevent dehydration or heat stroke.  · Work out until your breathing and your heartbeat get faster.  Where to find more information  · [place] Department of Health and Human Services: www.hhs.gov  · Centers for Disease Control and Prevention (CDC): www.cdc.gov  Summary  · Exercising regularly is important. It will improve your overall fitness, flexibility, and endurance.  · Regular exercise also will improve your overall health. It can help you control your weight, reduce stress, and improve your bone density.  · Do not exercise so much that you hurt yourself, feel dizzy, or get very short of breath.  · Before you start a new exercise program, talk with your health care provider.  This information is not intended to replace advice given to you by your health care provider. Make sure you discuss any questions you have with your health care provider.  Document Revised: 11/30/2018 Document Reviewed: 11/08/2018  [employer] Patient Education © 2021 [employer]  Calorie Counting for Weight Loss  Calories are units of energy. Your body needs a certain number of calories from food to keep going throughout the day. When you eat or drink more calories than your body needs, your body stores the extra calories mostly as fat. When you eat or drink fewer calories than your body needs, your body burns fat to get the energy it needs.  Calorie counting means keeping track of how many calories you eat and drink each day. Calorie counting can be helpful if you need to lose weight. If you eat fewer calories than your body needs, you should lose weight. Ask your health care provider what a healthy weight is for you.  For calorie counting to work, you will need to eat the right number of calories each day to lose a healthy amount of weight per week. A dietitian can help you figure out how many calories you need in a  day and will suggest ways to reach your calorie goal.  · A healthy amount of weight to lose each week is usually 1-2 lb (0.5-0.9 kg). This usually means that your daily calorie intake should be reduced by 500-750 calories.  · Eating 1,200-1,500 calories a day can help most women lose weight.  · Eating 1,500-1,800 calories a day can help most men lose weight.  What do I need to know about calorie counting?  Work with your health care provider or dietitian to determine how many calories you should get each day. To meet your daily calorie goal, you will need to:  · Find out how many calories are in each food that you would like to eat. Try to do this before you eat.  · Decide how much of the food you plan to eat.  · Keep a food log. Do this by writing down what you ate and how many calories it had.  To successfully lose weight, it is important to balance calorie counting with a healthy lifestyle that includes regular activity.  Where do I find calorie information?    The number of calories in a food can be found on a Nutrition Facts label. If a food does not have a Nutrition Facts label, try to look up the calories online or ask your dietitian for help.  Remember that calories are listed per serving. If you choose to have more than one serving of a food, you will have to multiply the calories per serving by the number of servings you plan to eat. For example, the label on a package of bread might say that a serving size is 1 slice and that there are 90 calories in a serving. If you eat 1 slice, you will have eaten 90 calories. If you eat 2 slices, you will have eaten 180 calories.  How do I keep a food log?  After each time that you eat, record the following in your food log as soon as possible:  · What you ate. Be sure to include toppings, sauces, and other extras on the food.  · How much you ate. This can be measured in cups, ounces, or number of items.  · How many calories were in each food and drink.  · The total  number of calories in the food you ate.  Keep your food log near you, such as in a pocket-sized notebook or on an aleksandra or website on your mobile phone. Some programs will calculate calories for you and show you how many calories you have left to meet your daily goal.  What are some portion-control tips?  · Know how many calories are in a serving. This will help you know how many servings you can have of a certain food.  · Use a measuring cup to measure serving sizes. You could also try weighing out portions on a kitchen scale. With time, you will be able to estimate serving sizes for some foods.  · Take time to put servings of different foods on your favorite plates or in your favorite bowls and cups so you know what a serving looks like.  · Try not to eat straight from a food's packaging, such as from a bag or box. Eating straight from the package makes it hard to see how much you are eating and can lead to overeating. Put the amount you would like to eat in a cup or on a plate to make sure you are eating the right portion.  · Use smaller plates, glasses, and bowls for smaller portions and to prevent overeating.  · Try not to multitask. For example, avoid watching TV or using your computer while eating. If it is time to eat, sit down at a table and enjoy your food. This will help you recognize when you are full. It will also help you be more mindful of what and how much you are eating.  What are tips for following this plan?  Reading food labels  · Check the calorie count compared with the serving size. The serving size may be smaller than what you are used to eating.  · Check the source of the calories. Try to choose foods that are high in protein, fiber, and vitamins, and low in saturated fat, trans fat, and sodium.  Shopping  · Read nutrition labels while you shop. This will help you make healthy decisions about which foods to buy.  · Pay attention to nutrition labels for low-fat or fat-free foods. These foods  sometimes have the same number of calories or more calories than the full-fat versions. They also often have added sugar, starch, or salt to make up for flavor that was removed with the fat.  · Make a grocery list of lower-calorie foods and stick to it.  Cooking  · Try to cook your favorite foods in a healthier way. For example, try baking instead of frying.  · Use low-fat dairy products.  Meal planning  · Use more fruits and vegetables. One-half of your plate should be fruits and vegetables.  · Include lean proteins, such as chicken, turkey, and fish.  Lifestyle  Each week, aim to do one of the following:  · 150 minutes of moderate exercise, such as walking.  · 75 minutes of vigorous exercise, such as running.  General information  · Know how many calories are in the foods you eat most often. This will help you calculate calorie counts faster.  · Find a way of tracking calories that works for you. Get creative. Try different apps or programs if writing down calories does not work for you.  What foods should I eat?    · Eat nutritious foods. It is better to have a nutritious, high-calorie food, such as an avocado, than a food with few nutrients, such as a bag of potato chips.  · Use your calories on foods and drinks that will fill you up and will not leave you hungry soon after eating.  ? Examples of foods that fill you up are nuts and nut butters, vegetables, lean proteins, and high-fiber foods such as whole grains. High-fiber foods are foods with more than 5 g of fiber per serving.  · Pay attention to calories in drinks. Low-calorie drinks include water and unsweetened drinks.  The items listed above may not be a complete list of foods and beverages you can eat. Contact a dietitian for more information.  What foods should I limit?  Limit foods or drinks that are not good sources of vitamins, minerals, or protein or that are high in unhealthy fats. These include:  · Candy.  · Other sweets.  · Sodas, specialty  coffee drinks, alcohol, and juice.  The items listed above may not be a complete list of foods and beverages you should avoid. Contact a dietitian for more information.  How do I count calories when eating out?  · Pay attention to portions. Often, portions are much larger when eating out. Try these tips to keep portions smaller:  ? Consider sharing a meal instead of getting your own.  ? If you get your own meal, eat only half of it. Before you start eating, ask for a container and put half of your meal into it.  ? When available, consider ordering smaller portions from the menu instead of full portions.  · Pay attention to your food and drink choices. Knowing the way food is cooked and what is included with the meal can help you eat fewer calories.  ? If calories are listed on the menu, choose the lower-calorie options.  ? Choose dishes that include vegetables, fruits, whole grains, low-fat dairy products, and lean proteins.  ? Choose items that are boiled, broiled, grilled, or steamed. Avoid items that are buttered, battered, fried, or served with cream sauce. Items labeled as crispy are usually fried, unless stated otherwise.  ? Choose water, low-fat milk, unsweetened iced tea, or other drinks without added sugar. If you want an alcoholic beverage, choose a lower-calorie option, such as a glass of wine or light beer.  ? Ask for dressings, sauces, and syrups on the side. These are usually high in calories, so you should limit the amount you eat.  ? If you want a salad, choose a garden salad and ask for grilled meats. Avoid extra toppings such as barreto, cheese, or fried items. Ask for the dressing on the side, or ask for olive oil and vinegar or lemon to use as dressing.  · Estimate how many servings of a food you are given. Knowing serving sizes will help you be aware of how much food you are eating at restaurants.  Where to find more information  · Centers for Disease Control and Prevention: www.cdc.gov  · [place]  Department of Agriculture: myplate.gov  Summary  · Calorie counting means keeping track of how many calories you eat and drink each day. If you eat fewer calories than your body needs, you should lose weight.  · A healthy amount of weight to lose per week is usually 1-2 lb (0.5-0.9 kg). This usually means reducing your daily calorie intake by 500-750 calories.  · The number of calories in a food can be found on a Nutrition Facts label. If a food does not have a Nutrition Facts label, try to look up the calories online or ask your dietitian for help.  · Use smaller plates, glasses, and bowls for smaller portions and to prevent overeating.  · Use your calories on foods and drinks that will fill you up and not leave you hungry shortly after a meal.  This information is not intended to replace advice given to you by your health care provider. Make sure you discuss any questions you have with your health care provider.  Document Revised: 01/28/2021 Document Reviewed: 01/28/2021  [employer] Patient Education © 2021 [employer]  BMI for Adults  What is BMI?  Body mass index (BMI) is a number that is calculated from a person's weight and height. BMI can help estimate how much of a person's weight is composed of fat. BMI does not measure body fat directly. Rather, it is an alternative to procedures that directly measure body fat, which can be difficult and expensive.  BMI can help identify people who may be at higher risk for certain medical problems.  What are BMI measurements used for?  BMI is used as a screening tool to identify possible weight problems. It helps determine whether a person is obese, overweight, a healthy weight, or underweight.  BMI is useful for:  · Identifying a weight problem that may be related to a medical condition or may increase the risk for medical problems.  · Promoting changes, such as changes in diet and exercise, to help reach a healthy weight. BMI screening can be repeated to see if these  "changes are working.  How is BMI calculated?  BMI involves measuring your weight in relation to your height. Both height and weight are measured, and the BMI is calculated from those numbers. This can be done either in English (U.S[place] or metric measurements. Note that charts and online BMI calculators are available to help you find your BMI quickly and easily without having to do these calculations yourself.  To calculate your BMI in English (U.S[place] measurements:    1. Measure your weight in pounds (lb).  2. Multiply the number of pounds by 703.  ? For example, for a person who weighs 180 lb, multiply that number by 703, which equals 126,540.  3. Measure your height in inches. Then multiply that number by itself to get a measurement called \"inches squared.\"  ? For example, for a person who is 70 inches tall, the \"inches squared\" measurement is 70 inches x 70 inches, which equals 4,900 inches squared.  4. Divide the total from step 2 (number of lb x 703) by the total from step 3 (inches squared): 126,540 ÷ 4,900 = 25.8. This is your BMI.    To calculate your BMI in metric measurements:  1. Measure your weight in kilograms (kg).  2. Measure your height in meters (m). Then multiply that number by itself to get a measurement called \"meters squared.\"  ? For example, for a person who is 1.75 m tall, the \"meters squared\" measurement is 1.75 m x 1.75 m, which is equal to 3.1 meters squared.  3. Divide the number of kilograms (your weight) by the meters squared number. In this example: 70 ÷ 3.1 = 22.6. This is your BMI.  What do the results mean?  BMI charts are used to identify whether you are underweight, normal weight, overweight, or obese. The following guidelines will be used:  · Underweight: BMI less than 18.5.  · Normal weight: BMI between 18.5 and 24.9.  · Overweight: BMI between 25 and 29.9.  · Obese: BMI of 30 or above.  Keep these notes in mind:  · Weight includes both fat and muscle, so someone with a muscular " build, such as an athlete, may have a BMI that is higher than 24.9. In cases like these, BMI is not an accurate measure of body fat.  · To determine if excess body fat is the cause of a BMI of 25 or higher, further assessments may need to be done by a health care provider.  · BMI is usually interpreted in the same way for men and women.  Where to find more information  For more information about BMI, including tools to quickly calculate your BMI, go to these websites:  · Centers for Disease Control and Prevention: www.cdc.gov  · American Heart Association: www.heart.org  · National Heart, Lung, and Blood [place]: www.nhlbi.nih.gov  Summary  · Body mass index (BMI) is a number that is calculated from a person's weight and height.  · BMI may help estimate how much of a person's weight is composed of fat. BMI can help identify those who may be at higher risk for certain medical problems.  · BMI can be measured using English measurements or metric measurements.  · BMI charts are used to identify whether you are underweight, normal weight, overweight, or obese.  This information is not intended to replace advice given to you by your health care provider. Make sure you discuss any questions you have with your health care provider.  Document Revised: 09/09/2020 Document Reviewed: 07/17/2020  Elsevier Patient Education © 2021 [employer]

## 2021-11-06 LAB
ALBUMIN SERPL-MCNC: 5 G/DL (ref 3.8–4.8)
ALBUMIN/GLOB SERPL: 2.3 {RATIO} (ref 1.2–2.2)
ALP SERPL-CCNC: 61 IU/L (ref 44–121)
ALT SERPL-CCNC: 12 IU/L (ref 0–44)
AST SERPL-CCNC: 17 IU/L (ref 0–40)
BASOPHILS # BLD AUTO: 0 X10E3/UL (ref 0–0.2)
BASOPHILS NFR BLD AUTO: 0 %
BILIRUB SERPL-MCNC: 1.1 MG/DL (ref 0–1.2)
BUN SERPL-MCNC: 10 MG/DL (ref 8–27)
BUN/CREAT SERPL: 11 (ref 10–24)
CALCIUM SERPL-MCNC: 9.7 MG/DL (ref 8.6–10.2)
CHLORIDE SERPL-SCNC: 102 MMOL/L (ref 96–106)
CHOLEST SERPL-MCNC: 290 MG/DL (ref 100–199)
CHOLEST/HDLC SERPL: 3.1 RATIO (ref 0–5)
CO2 SERPL-SCNC: 19 MMOL/L (ref 20–29)
CREAT SERPL-MCNC: 0.92 MG/DL (ref 0.76–1.27)
EOSINOPHIL # BLD AUTO: 0.4 X10E3/UL (ref 0–0.4)
EOSINOPHIL NFR BLD AUTO: 5 %
ERYTHROCYTE [DISTWIDTH] IN BLOOD BY AUTOMATED COUNT: 12.2 % (ref 11.6–15.4)
GLOBULIN SER CALC-MCNC: 2.2 G/DL (ref 1.5–4.5)
GLUCOSE SERPL-MCNC: 90 MG/DL (ref 65–99)
HCT VFR BLD AUTO: 45.7 % (ref 37.5–51)
HDLC SERPL-MCNC: 94 MG/DL
HGB BLD-MCNC: 15.7 G/DL (ref 13–17.7)
IMM GRANULOCYTES # BLD AUTO: 0 X10E3/UL (ref 0–0.1)
IMM GRANULOCYTES NFR BLD AUTO: 0 %
LDLC SERPL CALC-MCNC: 184 MG/DL (ref 0–99)
LYMPHOCYTES # BLD AUTO: 2.9 X10E3/UL (ref 0.7–3.1)
LYMPHOCYTES NFR BLD AUTO: 37 %
MCH RBC QN AUTO: 31.5 PG (ref 26.6–33)
MCHC RBC AUTO-ENTMCNC: 34.4 G/DL (ref 31.5–35.7)
MCV RBC AUTO: 92 FL (ref 79–97)
MONOCYTES # BLD AUTO: 0.7 X10E3/UL (ref 0.1–0.9)
MONOCYTES NFR BLD AUTO: 9 %
NEUTROPHILS # BLD AUTO: 3.8 X10E3/UL (ref 1.4–7)
NEUTROPHILS NFR BLD AUTO: 49 %
PLATELET # BLD AUTO: 243 X10E3/UL (ref 150–450)
POTASSIUM SERPL-SCNC: 4.3 MMOL/L (ref 3.5–5.2)
PROT SERPL-MCNC: 7.2 G/DL (ref 6–8.5)
PSA SERPL-MCNC: 0.4 NG/ML (ref 0–4)
RBC # BLD AUTO: 4.99 X10E6/UL (ref 4.14–5.8)
SODIUM SERPL-SCNC: 140 MMOL/L (ref 134–144)
T4 FREE SERPL-MCNC: 1.7 NG/DL (ref 0.82–1.77)
TRIGL SERPL-MCNC: 78 MG/DL (ref 0–149)
TSH SERPL DL<=0.005 MIU/L-ACNC: 0.93 UIU/ML (ref 0.45–4.5)
VLDLC SERPL CALC-MCNC: 12 MG/DL (ref 5–40)
WBC # BLD AUTO: 7.8 X10E3/UL (ref 3.4–10.8)

## 2022-03-01 RX ORDER — LEVOTHYROXINE SODIUM 175 UG/1
175 TABLET ORAL DAILY
Qty: 90 TABLET | Refills: 3 | Status: SHIPPED | OUTPATIENT
Start: 2022-03-01

## 2022-03-01 RX ORDER — LEVOTHYROXINE SODIUM 175 MCG
TABLET ORAL
Qty: 90 TABLET | Refills: 3 | Status: SHIPPED | OUTPATIENT
Start: 2022-03-01 | End: 2022-03-01 | Stop reason: SDUPTHER

## 2022-03-01 NOTE — TELEPHONE ENCOUNTER
Caller: Waqas Odonnell    Relationship: Self    Requested Prescriptions:   Requested Prescriptions     Pending Prescriptions Disp Refills   • levothyroxine (Synthroid) 175 MCG tablet 90 tablet 3     Sig: Take 1 tablet by mouth Daily.        Pharmacy where request should be sent: [facility] - [place], [place] - [address] AT PORTAL TO REGISTERED [facility] - [phone] [facility] [phone] FX

## 2022-05-03 ENCOUNTER — OFFICE VISIT (OUTPATIENT)
Dept: INTERNAL MEDICINE | Facility: CLINIC | Age: 62
End: 2022-05-03

## 2022-05-03 VITALS
HEART RATE: 79 BPM | SYSTOLIC BLOOD PRESSURE: 128 MMHG | HEIGHT: 69 IN | BODY MASS INDEX: 24.88 KG/M2 | TEMPERATURE: 98.4 F | WEIGHT: 168 LBS | DIASTOLIC BLOOD PRESSURE: 88 MMHG | OXYGEN SATURATION: 99 % | RESPIRATION RATE: 18 BRPM

## 2022-05-03 DIAGNOSIS — E78.5 HYPERLIPIDEMIA, UNSPECIFIED HYPERLIPIDEMIA TYPE: Primary | ICD-10-CM

## 2022-05-03 DIAGNOSIS — E03.9 HYPOTHYROIDISM, UNSPECIFIED TYPE: Chronic | ICD-10-CM

## 2022-05-03 PROCEDURE — 99213 OFFICE O/P EST LOW 20 MIN: CPT | Performed by: INTERNAL MEDICINE

## 2022-05-03 NOTE — PROGRESS NOTES
Subjective   Waqas Odonnell is a 61 y.o. male.     Chief Complaint   Patient presents with   • Hyperlipidemia         Hyperlipidemia  This is a chronic problem. The current episode started more than 1 year ago. The problem is controlled. Recent lipid tests were reviewed and are normal. Exacerbating diseases include hypothyroidism. There are no known factors aggravating his hyperlipidemia. Pertinent negatives include no chest pain or shortness of breath.   Hypothyroidism  This is a chronic problem. The current episode started more than 1 year ago. The problem occurs constantly. The problem has been unchanged. Pertinent negatives include no chest pain. The treatment provided significant relief.   Anxiety  Presents for follow-up visit. Patient reports no chest pain, palpitations or shortness of breath.       Heartburn  He complains of heartburn. He reports no chest pain or no wheezing. This is a chronic problem. The current episode started more than 1 year ago. The heartburn is of mild intensity. Risk factors include caffeine use. He has tried a histamine-2 antagonist for the symptoms. The treatment provided significant relief.        The following portions of the patient's history were reviewed and updated as appropriate: allergies, current medications, past social history and problem list.    No outpatient medications have been marked as taking for the 5/3/22 encounter (Office Visit) with Brian Boo MD.       Review of Systems   Respiratory: Negative for shortness of breath and wheezing.    Cardiovascular: Negative for chest pain, palpitations and leg swelling.   Gastrointestinal: Positive for heartburn. Negative for abdominal distention, constipation and diarrhea.   Endocrine: Positive for cold intolerance.       Objective   Vitals:    05/03/22 1542   BP: 128/88   Pulse: 79   Resp: 18   Temp: 98.4 °F (36.9 °C)   SpO2: 99%          05/03/22  1542   Weight: 76.2 kg (168 lb)    [unfilled]  Body mass index is  24.81 kg/m².      Physical Exam   Constitutional: He appears well-developed.   Neck: No thyromegaly present.   Cardiovascular: Normal rate, regular rhythm and normal heart sounds. Exam reveals no gallop.   No murmur heard.  Pulmonary/Chest: Effort normal and breath sounds normal. No respiratory distress. He has no wheezes. He has no rales.   Abdominal: Normal appearance.   Neurological: He is alert.         Problems Addressed this Visit        Cardiac and Vasculature    Hyperlipidemia - Primary (Chronic)    Relevant Orders    Lipid Panel With / Chol / HDL Ratio       Endocrine and Metabolic    Hypothyroidism (Chronic)      Diagnoses       Codes Comments    Hyperlipidemia, unspecified hyperlipidemia type    -  Primary ICD-10-CM: E78.5  ICD-9-CM: 272.4     Hypothyroidism, unspecified type     ICD-10-CM: E03.9  ICD-9-CM: 244.9         Assessment/Plan   In for recheck of hyperlipidemia, GERD, and hypothyroidism.  His GERD is worse.  Requires Zantac twice daily.  Thyroid is under good control.  Annual lab work and annual exam November 2022.  He has previously failed to statins due to myalgias.  Recently had to stop his Zetia for the same reason.  Symptoms cleared up in about 2 weeks.  Currently off of treatment.  He is fasting today.  He runs a high HDL so it is not likely he really needs to pursue treatment of lipids as aggressively as he has in the past.  He remains on Synthroid 175 MCG daily for his hypothyroidism.  Generally cold intolerant.  He had a CT calcium score in July 2020 which was 21 which was excellent.  He is fasting today.    The above information was reviewed again today 05/03/22.  It continues to be accurate as reflected above and is unchanged.  History, physical and review of systems all reviewed and are unchanged.  Medications were reviewed today and continue the current dosing.    PPE today includes face mask and eye shield.         Dragon disclaimer:   Much of this encounter note is an electronic  transcription/translation of spoken language to printed text. The electronic translation of spoken language may permit erroneous, or at times, nonsensical words or phrases to be inadvertently transcribed; Although I have reviewed the note for such errors, some may still exist.

## 2022-05-04 LAB
CHOLEST SERPL-MCNC: 244 MG/DL (ref 100–199)
CHOLEST/HDLC SERPL: 2.5 RATIO (ref 0–5)
HDLC SERPL-MCNC: 98 MG/DL
LDLC SERPL CALC-MCNC: 137 MG/DL (ref 0–99)
TRIGL SERPL-MCNC: 55 MG/DL (ref 0–149)
VLDLC SERPL CALC-MCNC: 9 MG/DL (ref 5–40)

## 2022-10-20 ENCOUNTER — OFFICE VISIT (OUTPATIENT)
Dept: INTERNAL MEDICINE | Facility: CLINIC | Age: 62
End: 2022-10-20

## 2022-10-20 VITALS
OXYGEN SATURATION: 96 % | TEMPERATURE: 97.8 F | BODY MASS INDEX: 24.73 KG/M2 | WEIGHT: 167 LBS | HEART RATE: 76 BPM | HEIGHT: 69 IN | DIASTOLIC BLOOD PRESSURE: 78 MMHG | SYSTOLIC BLOOD PRESSURE: 122 MMHG | RESPIRATION RATE: 18 BRPM

## 2022-10-20 DIAGNOSIS — B00.9 HERPES: ICD-10-CM

## 2022-10-20 DIAGNOSIS — Z71.1 CONCERN ABOUT STD IN MALE WITHOUT DIAGNOSIS: Primary | ICD-10-CM

## 2022-10-20 PROCEDURE — 99213 OFFICE O/P EST LOW 20 MIN: CPT | Performed by: INTERNAL MEDICINE

## 2022-10-20 NOTE — PROGRESS NOTES
Subjective   Waqas Odonnell is a 62 y.o. male.     Chief Complaint   Patient presents with   • Injury   • Penis Pain     Spot on genital area          History of Present Illness  In today with lesions on his penis over the past 2 months or so.  Last sexual contact was 2 months prior to that when he broke up with his girlfriend.  Had both oral and vaginal sex.  He has never had cold sores before.  The lesions seem to last about a week.  They are not painful.  Nondescript.  Almost sounds like a pimple comes out and then breaks in the skin of the sore spot.  He has had no discharge from his penis.       The following portions of the patient's history were reviewed and updated as appropriate: allergies, current medications, past social history and problem list.    Outpatient Medications Marked as Taking for the 10/20/22 encounter (Office Visit) with Brian Boo MD   Medication Sig Dispense Refill   • famotidine (PEPCID) 40 MG tablet Take 1 tablet by mouth 2 (Two) Times a Day. 180 tablet 3   • levothyroxine (Synthroid) 175 MCG tablet Take 1 tablet by mouth Daily. 90 tablet 3       Review of Systems   Constitutional: Negative for chills and fever.   Genitourinary: Negative for dysuria.   Skin: Positive for rash.       Objective   Vitals:    10/20/22 1423   BP: 122/78   Pulse: 76   Resp: 18   Temp: 97.8 °F (36.6 °C)   SpO2: 96%      Wt Readings from Last 3 Encounters:   10/20/22 75.8 kg (167 lb)   05/03/22 76.2 kg (168 lb)   11/04/21 75.8 kg (167 lb)    Body mass index is 24.66 kg/m².      Physical Exam  Constitutional:       Appearance: Normal appearance.   Genitourinary:     Penis: Normal.       Testes: Normal.   Neurological:      Mental Status: He is alert.           Problems Addressed this Visit    None  Visit Diagnoses     Concern about STD in male without diagnosis    -  Primary      Diagnoses       Codes Comments    Concern about STD in male without diagnosis    -  Primary ICD-10-CM: Z71.1  ICD-9-CM:  V65.5         Assessment & Plan   In today with concerns about STD.  He is specifically worried about herpes.  He has had a few lesions on his penis over the past 2 months, perhaps 3 or 4.  They are nondescript.  Almost look like whiteheads and then they break.  Not painful.  No prior history of STD.  He had a steady girlfriend over the past 2 years and they broke up 2 months prior to the onset of his symptoms.  We will check an STD screening to include type I and type II herpes titers.    The above information was reviewed again today 10/20/22.  It continues to be accurate as reflected above and is unchanged.  History, physical and review of systems all reviewed and are unchanged.  Medications were reviewed today and continue the current dosing.    PPE today includes face mask and eye shield.             Dragon disclaimer:   Much of this encounter note is an electronic transcription/translation of spoken language to printed text. The electronic translation of spoken language may permit erroneous, or at times, nonsensical words or phrases to be inadvertently transcribed; Although I have reviewed the note for such errors, some may still exist.

## 2022-10-21 LAB
C TRACH RRNA SPEC QL NAA+PROBE: NEGATIVE
HIV 1+2 AB+HIV1 P24 AG SERPL QL IA: NON REACTIVE
HSV1 IGG SER IA-ACNC: <0.91 INDEX (ref 0–0.9)
HSV2 IGG SER IA-ACNC: >23.6 INDEX (ref 0–0.9)
N GONORRHOEA RRNA SPEC QL NAA+PROBE: NEGATIVE
RPR SER QL: NORMAL

## 2022-10-24 RX ORDER — ACYCLOVIR 400 MG/1
400 TABLET ORAL
Qty: 30 TABLET | Refills: 0 | Status: SHIPPED | OUTPATIENT
Start: 2022-10-24 | End: 2022-12-01

## 2022-11-09 ENCOUNTER — TELEPHONE (OUTPATIENT)
Dept: INTERNAL MEDICINE | Facility: CLINIC | Age: 62
End: 2022-11-09

## 2022-11-09 NOTE — TELEPHONE ENCOUNTER
PATIENT CALLED BACK AND WOULD LIKE TO SCHEDULE AN APPOINTMENT. I WAS UNABLE TO SCHEDULE AN APPOINTMENT FOR THIS WEEK. CAN YOU SQUEEZE HIM IN? HE WOULD LIKE A CALL BACK.    [phone]

## 2022-11-09 NOTE — TELEPHONE ENCOUNTER
Caller: Waqas Odonnell    Relationship: Self    Best call back number:[phone]    What is the best time to reach you:ANYTIME  Who are you requesting to speak with (clinical staff, provider,  specific staff member):CLINICAL STAFF  Do you know the name of the person who called: SELF  What was the call regarding: PATIENT CALLED AND STATED IF YOU COULD CALL IN SOME MEDICINE FOR HIM HE IS COUGHING UP GREEN STUFF. PATEIN AID TO CALL IN TO [facility] ON [address]. PLEASE CALL  Do you require a callback:YES

## 2022-11-10 ENCOUNTER — OFFICE VISIT (OUTPATIENT)
Dept: INTERNAL MEDICINE | Facility: CLINIC | Age: 62
End: 2022-11-10

## 2022-11-10 ENCOUNTER — TELEMEDICINE (OUTPATIENT)
Dept: INTERNAL MEDICINE | Facility: CLINIC | Age: 62
End: 2022-11-10

## 2022-11-10 DIAGNOSIS — J06.9 VIRAL UPPER RESPIRATORY TRACT INFECTION: Primary | ICD-10-CM

## 2022-11-10 PROCEDURE — 99213 OFFICE O/P EST LOW 20 MIN: CPT | Performed by: INTERNAL MEDICINE

## 2022-11-10 RX ORDER — AMOXICILLIN AND CLAVULANATE POTASSIUM 875; 125 MG/1; MG/1
1 TABLET, FILM COATED ORAL EVERY 12 HOURS SCHEDULED
Qty: 20 TABLET | Refills: 0 | Status: SHIPPED | OUTPATIENT
Start: 2022-11-10

## 2022-11-10 RX ORDER — AMOXICILLIN AND CLAVULANATE POTASSIUM 875; 125 MG/1; MG/1
1 TABLET, FILM COATED ORAL EVERY 12 HOURS SCHEDULED
Qty: 20 TABLET | Refills: 0 | Status: SHIPPED | OUTPATIENT
Start: 2022-11-10 | End: 2022-11-10 | Stop reason: SDUPTHER

## 2022-11-10 NOTE — PROGRESS NOTES
Mode of Visit: Video  Location of patient: other: Work   Location of provider: [facility]  You have chosen to receive care through a telehealth visit.  Does the patient consent to use a video/audio connection for your medical care today? Yes   The visit included audio and video interaction. No technical issues occurred during this visit.

## 2022-11-10 NOTE — PROGRESS NOTES
Subjective   Waqas Odonnell is a 62 y.o. male.     Chief Complaint   Patient presents with   • Cough   • Nasal Congestion         Cough  This is a new problem. The current episode started in the past 7 days. Associated symptoms include rhinorrhea and shortness of breath. Pertinent negatives include no chills, fever or sore throat.        The following portions of the patient's history were reviewed and updated as appropriate: allergies, current medications, past social history and problem list.    Outpatient Medications Marked as Taking for the 11/10/22 encounter (Telemedicine) with Brian Boo MD   Medication Sig Dispense Refill   • amoxicillin-clavulanate (Augmentin) 875-125 MG per tablet Take 1 tablet by mouth Every 12 (Twelve) Hours. 20 tablet 0   • famotidine (PEPCID) 40 MG tablet Take 1 tablet by mouth 2 (Two) Times a Day. 180 tablet 3   • levothyroxine (Synthroid) 175 MCG tablet Take 1 tablet by mouth Daily. 90 tablet 3       Review of Systems   Constitutional: Negative for chills and fever.   HENT: Positive for rhinorrhea. Negative for sinus pressure, sinus pain and sore throat.    Respiratory: Positive for cough and shortness of breath.        Objective   There were no vitals filed for this visit.   Wt Readings from Last 3 Encounters:   10/20/22 75.8 kg (167 lb)   05/03/22 76.2 kg (168 lb)   11/04/21 75.8 kg (167 lb)    There is no height or weight on file to calculate BMI.      Physical Exam  Constitutional:       Appearance: Normal appearance.   Pulmonary:      Effort: Pulmonary effort is normal.   Neurological:      Mental Status: He is alert.   Psychiatric:         Mood and Affect: Mood normal.         Behavior: Behavior normal.         Thought Content: Thought content normal.         Judgment: Judgment normal.            Problems Addressed this Visit    None  Visit Diagnoses     Viral upper respiratory tract infection    -  Primary      Diagnoses       Codes Comments    Viral upper  respiratory tract infection    -  Primary ICD-10-CM: J06.9  ICD-9-CM: 465.9         Assessment & Plan   Video visit today due to COVID pandemic.  He is got a 1 week illness.  Head congestion.  Cough.  The cough is the main symptom.  Some shortness of breath.  Greenish sputum production.  He started Mucinex DM last night.  Advised to continue on the Mucinex DM.  Begin Augmentin 875 twice daily for 10 days.  Also gave him some bites on his penile lesions.  Apparently has had no recurrences after the first 2 outbreaks and he is now wondering if they had something to do with shaving himself in his genital area.  Hard to know about that.  Irregardless no need to take the acyclovir at this point.  He never started it.  Make sure he wears a condom with any sexual activity.  [employer] platform used for the visit today.    The above information was reviewed again today 11/10/22.  It continues to be accurate as reflected above and is unchanged.  History, physical and review of systems all reviewed and are unchanged.  Medications were reviewed today and continue the current dosing.             Pedro disclaimer:   Much of this encounter note is an electronic transcription/translation of spoken language to printed text. The electronic translation of spoken language may permit erroneous, or at times, nonsensical words or phrases to be inadvertently transcribed; Although I have reviewed the note for such errors, some may still exist.

## 2022-12-01 ENCOUNTER — OFFICE VISIT (OUTPATIENT)
Dept: INTERNAL MEDICINE | Facility: CLINIC | Age: 62
End: 2022-12-01

## 2022-12-01 VITALS
HEART RATE: 90 BPM | DIASTOLIC BLOOD PRESSURE: 78 MMHG | OXYGEN SATURATION: 96 % | HEIGHT: 69 IN | BODY MASS INDEX: 25.18 KG/M2 | RESPIRATION RATE: 18 BRPM | SYSTOLIC BLOOD PRESSURE: 118 MMHG | TEMPERATURE: 98.7 F | WEIGHT: 170 LBS

## 2022-12-01 DIAGNOSIS — Z12.5 SCREENING FOR PROSTATE CANCER: ICD-10-CM

## 2022-12-01 DIAGNOSIS — E78.5 HYPERLIPIDEMIA, UNSPECIFIED HYPERLIPIDEMIA TYPE: ICD-10-CM

## 2022-12-01 DIAGNOSIS — Z00.00 ENCOUNTER FOR PREVENTIVE HEALTH EXAMINATION: Primary | ICD-10-CM

## 2022-12-01 DIAGNOSIS — E03.9 HYPOTHYROIDISM, UNSPECIFIED TYPE: ICD-10-CM

## 2022-12-01 DIAGNOSIS — B00.9 HERPES: ICD-10-CM

## 2022-12-01 PROCEDURE — 99396 PREV VISIT EST AGE 40-64: CPT | Performed by: INTERNAL MEDICINE

## 2022-12-01 RX ORDER — FAMOTIDINE 40 MG/1
40 TABLET, FILM COATED ORAL 2 TIMES DAILY
Qty: 180 TABLET | Refills: 3 | Status: SHIPPED | OUTPATIENT
Start: 2022-12-01

## 2022-12-01 RX ORDER — ACYCLOVIR 400 MG/1
400 TABLET ORAL 2 TIMES DAILY
Qty: 180 TABLET | Refills: 3 | Status: SHIPPED | OUTPATIENT
Start: 2022-12-01 | End: 2022-12-05 | Stop reason: SDUPTHER

## 2022-12-01 NOTE — PROGRESS NOTES
Subjective   Waqas Odonnell is a 62 y.o. male.     Chief Complaint   Patient presents with   • Annual Exam   • Hyperlipidemia   • Hypothyroidism         History of Present Illness  In for annual preventative exam.  He gets about 6 hours of sleep at night.  Interrupted.  Feels rested.  Exercises 0 days/week but does heavy work every day..  Energy is okay.  Is been high strung.  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  Does some push-ups and curls almost every day.  Works in the [employer] for [employer].  Energy is good.  Diet is balanced.    Hyperlipidemia  Exacerbating diseases include hypothyroidism. Pertinent negatives include no chest pain, myalgias or shortness of breath.   Hypothyroidism  Associated symptoms include abdominal pain (heartburn controlled with Zantac) and headaches. Pertinent negatives include no arthralgias, chest pain, chills, coughing, diaphoresis, fatigue, fever, myalgias, nausea, neck pain, vomiting or weakness.        The following portions of the patient's history were reviewed and updated as appropriate: allergies, current medications, past social history and problem list.    HISTORY  Outpatient Medications Marked as Taking for the 12/1/22 encounter (Office Visit) with Brian Boo MD   Medication Sig Dispense Refill   • acyclovir (Zovirax) 400 MG tablet Take 1 tablet by mouth Every 4 (Four) Hours While Awake. Take no more than 5 doses a day. 30 tablet 0   • amoxicillin-clavulanate (Augmentin) 875-125 MG per tablet Take 1 tablet by mouth Every 12 (Twelve) Hours. 20 tablet 0   • famotidine (PEPCID) 40 MG tablet Take 1 tablet by mouth 2 (Two) Times a Day. 180 tablet 3   • levothyroxine (Synthroid) 175 MCG tablet Take 1 tablet by mouth Daily. 90 tablet 3     Social History     Socioeconomic History   • Marital status:    Tobacco Use   • Smoking status: Former     Packs/day: 0.10     Types: Cigarettes     Start date: 1980     Quit date: 1991     Years since quitting:  31.9   • Smokeless tobacco: Never   • Tobacco comments:     Weekend smoker   Substance and Sexual Activity   • Alcohol use: Yes     Alcohol/week: 12.0 standard drinks     Types: 12 Cans of beer per week     Comment: Beer: Weekends   • Drug use: Not Currently     Family History   Problem Relation Age of Onset   • Diabetes Mother    • Heart attack Father    • No Known Problems Sister    • No Known Problems Brother    • No Known Problems Daughter    • No Known Problems Son    • No Known Problems Brother      History reviewed. No pertinent past medical history.  History reviewed. No pertinent surgical history.    Review of Systems   Constitutional: Negative for chills, diaphoresis, fatigue, fever and unexpected weight change.   Respiratory: Negative for cough, chest tightness, shortness of breath and wheezing.    Cardiovascular: Negative for chest pain, palpitations and leg swelling.   Gastrointestinal: Positive for abdominal pain (heartburn controlled with Zantac). Negative for anal bleeding, blood in stool, constipation, diarrhea, nausea, rectal pain and vomiting.   Endocrine: Negative for cold intolerance, heat intolerance and polyuria.   Genitourinary: Negative for difficulty urinating, dysuria, frequency, hematuria and urgency.   Musculoskeletal: Negative for arthralgias, back pain, myalgias and neck pain.   Allergic/Immunologic: Negative for environmental allergies.   Neurological: Positive for headaches. Negative for dizziness, syncope, weakness and light-headedness.   Hematological: Negative for adenopathy. Does not bruise/bleed easily.   Psychiatric/Behavioral: Positive for dysphoric mood. Negative for confusion and sleep disturbance. The patient is nervous/anxious.        Objective   Vitals:    12/01/22 1521   BP: 118/78   Pulse: 90   Resp: 18   Temp: 98.7 °F (37.1 °C)   SpO2: 96%          12/01/22  1521   Weight: 77.1 kg (170 lb)    [unfilled]  Body mass index is 25.1 kg/m².      Physical Exam    Constitutional: He is oriented to person, place, and time. He appears well-developed and well-nourished.   HENT:   Head: Normocephalic and atraumatic.   Right Ear: External ear normal.   Left Ear: External ear normal.   Nose: Nose normal.   Mouth/Throat: Oropharynx is clear and moist.   Eyes: Pupils are equal, round, and reactive to light. Conjunctivae and EOM are normal. No scleral icterus.   Neck: No JVD present. No thyromegaly present.   Cardiovascular: Normal rate, normal heart sounds and intact distal pulses. An irregular rhythm present. Exam reveals no gallop and no friction rub.   No murmur heard.  Pulmonary/Chest: Effort normal and breath sounds normal. No respiratory distress. He has no wheezes. He has no rales.   Abdominal: Soft. Normal appearance and bowel sounds are normal. He exhibits no distension and no mass. There is no abdominal tenderness. There is no rebound and no guarding. No hernia.   Genitourinary:    Prostate, testes, penis and rectum normal.     Musculoskeletal: Normal range of motion. No edema.   Lymphadenopathy:     He has no cervical adenopathy.   Neurological: He is alert and oriented to person, place, and time. He has normal reflexes. He displays normal reflexes.   Skin: Skin is warm and dry.   Psychiatric: He has a normal mood and affect. His behavior is normal. Mood, judgment and thought content normal.   Nursing note and vitals reviewed.    Procedures        Problems Addressed this Visit        Cardiac and Vasculature    Hyperlipidemia (Chronic)    Relevant Orders    Lipid Panel With / Chol / HDL Ratio       Endocrine and Metabolic    Hypothyroidism (Chronic)    Relevant Orders    TSH    T4, free   Other Visit Diagnoses     Encounter for preventive health examination    -  Primary    Relevant Orders    CBC & Differential    Comprehensive metabolic panel    Screening for prostate cancer        Relevant Orders    PSA Screen      Diagnoses       Codes Comments    Encounter for  preventive health examination    -  Primary ICD-10-CM: Z00.00  ICD-9-CM: V70.0     Hyperlipidemia, unspecified hyperlipidemia type     ICD-10-CM: E78.5  ICD-9-CM: 272.4     Hypothyroidism, unspecified type     ICD-10-CM: E03.9  ICD-9-CM: 244.9     Screening for prostate cancer     ICD-10-CM: Z12.5  ICD-9-CM: V76.44         Assessment & Plan   In for annual preventative exam.  Overall health is been excellent.  He has hypothyroidism on replacement.  Hyperlipidemia.  GERD controlled on famotidine.  We'll plan on checking lipids every 6 months.  Annual lab today 2022 including CBC, CMP, lipids, PSA, TSH, free T4.  We'll monitor lipids in 6 month intervals.  Due for shingrix.  He's been intolerant to statins.  Tried Livalo, pravastatin, atorvastatin.  Also failed Zetia with arm pain.  He feels like he is hyper a lot of the time does not want to take medicine for it.  Has some anger issues.  Took care of his elderly mother was Alzheimer's disease at end-of-life and now his father is also  from Alzheimer's disease at end-of-life.  Would probably benefit from an SSRI.  He is pretty worked up and emotional today.  Due for Shingrix.  He declines rectal exam today.  Just wants a PSA.  He has had 3 outbreaks on his penis since .  They last about 5 days.  Suspicious for herpes but not exactly classic.  We will begin on acyclovir 800 mg 3 times daily for 2 days followed by 400 mg twice daily for the next year to see if we can cut down on these.  They are causing quite a bit of emotional concern and he worries about being able to be around women are having sex in the future.  He is fasting today.    The above information was reviewed again today 22.  It continues to be accurate as reflected above and is unchanged.  History, physical and review of systems all reviewed and are unchanged.  Medications were reviewed today and continue the current dosing.    PPE today includes face mask and eye  shield.           Dragon disclaimer:   Much of this encounter note is an electronic transcription/translation of spoken language to printed text. The electronic translation of spoken language may permit erroneous, or at times, nonsensical words or phrases to be inadvertently transcribed; Although I have reviewed the note for such errors, some may still exist.

## 2022-12-02 LAB
ALBUMIN SERPL-MCNC: 5.2 G/DL (ref 3.5–5.2)
ALBUMIN/GLOB SERPL: 2.7 G/DL
ALP SERPL-CCNC: 74 U/L (ref 39–117)
ALT SERPL-CCNC: 22 U/L (ref 1–41)
AST SERPL-CCNC: 19 U/L (ref 1–40)
BASOPHILS # BLD AUTO: 0.04 10*3/MM3 (ref 0–0.2)
BASOPHILS NFR BLD AUTO: 0.5 % (ref 0–1.5)
BILIRUB SERPL-MCNC: 1 MG/DL (ref 0–1.2)
BUN SERPL-MCNC: 10 MG/DL (ref 8–23)
BUN/CREAT SERPL: 11.4 (ref 7–25)
CALCIUM SERPL-MCNC: 9.6 MG/DL (ref 8.6–10.5)
CHLORIDE SERPL-SCNC: 102 MMOL/L (ref 98–107)
CHOLEST SERPL-MCNC: 297 MG/DL (ref 0–200)
CHOLEST/HDLC SERPL: 3 {RATIO}
CO2 SERPL-SCNC: 28 MMOL/L (ref 22–29)
CREAT SERPL-MCNC: 0.88 MG/DL (ref 0.76–1.27)
EGFRCR SERPLBLD CKD-EPI 2021: 97.2 ML/MIN/1.73
EOSINOPHIL # BLD AUTO: 0.28 10*3/MM3 (ref 0–0.4)
EOSINOPHIL NFR BLD AUTO: 3.2 % (ref 0.3–6.2)
ERYTHROCYTE [DISTWIDTH] IN BLOOD BY AUTOMATED COUNT: 12.2 % (ref 12.3–15.4)
GLOBULIN SER CALC-MCNC: 1.9 GM/DL
GLUCOSE SERPL-MCNC: 95 MG/DL (ref 65–99)
HCT VFR BLD AUTO: 46.8 % (ref 37.5–51)
HDLC SERPL-MCNC: 99 MG/DL (ref 40–60)
HGB BLD-MCNC: 15.6 G/DL (ref 13–17.7)
IMM GRANULOCYTES # BLD AUTO: 0.02 10*3/MM3 (ref 0–0.05)
IMM GRANULOCYTES NFR BLD AUTO: 0.2 % (ref 0–0.5)
LDLC SERPL CALC-MCNC: 182 MG/DL (ref 0–100)
LYMPHOCYTES # BLD AUTO: 2.61 10*3/MM3 (ref 0.7–3.1)
LYMPHOCYTES NFR BLD AUTO: 29.5 % (ref 19.6–45.3)
MCH RBC QN AUTO: 31.1 PG (ref 26.6–33)
MCHC RBC AUTO-ENTMCNC: 33.3 G/DL (ref 31.5–35.7)
MCV RBC AUTO: 93.2 FL (ref 79–97)
MONOCYTES # BLD AUTO: 0.82 10*3/MM3 (ref 0.1–0.9)
MONOCYTES NFR BLD AUTO: 9.3 % (ref 5–12)
NEUTROPHILS # BLD AUTO: 5.09 10*3/MM3 (ref 1.7–7)
NEUTROPHILS NFR BLD AUTO: 57.3 % (ref 42.7–76)
NRBC BLD AUTO-RTO: 0 /100 WBC (ref 0–0.2)
PLATELET # BLD AUTO: 255 10*3/MM3 (ref 140–450)
POTASSIUM SERPL-SCNC: 4.1 MMOL/L (ref 3.5–5.2)
PROT SERPL-MCNC: 7.1 G/DL (ref 6–8.5)
PSA SERPL-MCNC: 0.41 NG/ML (ref 0–4)
RBC # BLD AUTO: 5.02 10*6/MM3 (ref 4.14–5.8)
SODIUM SERPL-SCNC: 140 MMOL/L (ref 136–145)
T4 FREE SERPL-MCNC: 1.37 NG/DL (ref 0.93–1.7)
TRIGL SERPL-MCNC: 99 MG/DL (ref 0–150)
TSH SERPL DL<=0.005 MIU/L-ACNC: 1.21 UIU/ML (ref 0.27–4.2)
VLDLC SERPL CALC-MCNC: 16 MG/DL (ref 5–40)
WBC # BLD AUTO: 8.86 10*3/MM3 (ref 3.4–10.8)

## 2022-12-05 ENCOUNTER — TELEPHONE (OUTPATIENT)
Dept: INTERNAL MEDICINE | Facility: CLINIC | Age: 62
End: 2022-12-05

## 2022-12-05 RX ORDER — ACYCLOVIR 400 MG/1
400 TABLET ORAL 2 TIMES DAILY
Qty: 180 TABLET | Refills: 3 | Status: SHIPPED | OUTPATIENT
Start: 2022-12-05

## 2022-12-05 NOTE — TELEPHONE ENCOUNTER
He should take Tylenol or Advil for the headache or body aches.  I would add some xyzal 5 mg daily for congestion

## 2022-12-05 NOTE — TELEPHONE ENCOUNTER
Pt c/o headache, cough, sneezing runny nose/eyes, congested since Saturday. Pt is taking sudafed but not helping much. Please advise if there is anything else he can take?

## 2022-12-07 ENCOUNTER — TELEPHONE (OUTPATIENT)
Dept: INTERNAL MEDICINE | Facility: CLINIC | Age: 62
End: 2022-12-07

## 2022-12-07 NOTE — TELEPHONE ENCOUNTER
[facility] [place] [facility] [address] [facility] AT Portal to [facility] - [phone]  - [phone] FX    Relationship to patient: Pharmacy    Best call back number: [phone] REF [phone]    Patient is needing: THEY NEED CLARIFICATION ON THE DIRECTIONS FOR THE acyclovir (Zovirax) 400 MG tablet  PLEASE REACH OUT TO CLARIFY

## 2022-12-08 ENCOUNTER — PATIENT ROUNDING (BHMG ONLY) (OUTPATIENT)
Dept: INTERNAL MEDICINE | Facility: CLINIC | Age: 62
End: 2022-12-08

## 2022-12-08 NOTE — PROGRESS NOTES
December 8, 2022    Hello, may I speak with Waqas Odonnell?    My name is Flor    I am  with [facility]  [facility] [address] [address] [phone].    Before we get started may I verify your date of birth? 1960    I am calling to officially welcome you to our practice and ask about your recent visit. Is this a good time to talk? yes    Tell me about your visit with us. What things went well?  My visit was very good.        We're always looking for ways to make our patients' experiences even better. Do you have recommendations on ways we may improve?  no    Overall were you satisfied with your first visit to our practice? yes       I appreciate you taking the time to speak with me today. Is there anything else I can do for you? no      Thank you, and have a great day.

## 2023-05-18 RX ORDER — LEVOTHYROXINE SODIUM 175 MCG
TABLET ORAL
Qty: 90 TABLET | Refills: 3 | Status: SHIPPED | OUTPATIENT
Start: 2023-05-18

## 2023-06-01 ENCOUNTER — LAB (OUTPATIENT)
Dept: LAB | Facility: HOSPITAL | Age: 63
End: 2023-06-01
Payer: COMMERCIAL

## 2023-06-01 ENCOUNTER — OFFICE VISIT (OUTPATIENT)
Dept: INTERNAL MEDICINE | Facility: CLINIC | Age: 63
End: 2023-06-01

## 2023-06-01 VITALS
RESPIRATION RATE: 16 BRPM | SYSTOLIC BLOOD PRESSURE: 134 MMHG | HEART RATE: 74 BPM | WEIGHT: 179.8 LBS | HEIGHT: 69 IN | BODY MASS INDEX: 26.63 KG/M2 | DIASTOLIC BLOOD PRESSURE: 82 MMHG | TEMPERATURE: 99.1 F | OXYGEN SATURATION: 96 %

## 2023-06-01 DIAGNOSIS — K21.9 GASTROESOPHAGEAL REFLUX DISEASE WITHOUT ESOPHAGITIS: Chronic | ICD-10-CM

## 2023-06-01 DIAGNOSIS — E78.5 HYPERLIPIDEMIA, UNSPECIFIED HYPERLIPIDEMIA TYPE: Primary | Chronic | ICD-10-CM

## 2023-06-01 DIAGNOSIS — E03.9 HYPOTHYROIDISM, UNSPECIFIED TYPE: Chronic | ICD-10-CM

## 2023-06-01 LAB
CHOLEST SERPL-MCNC: 261 MG/DL (ref 0–200)
HDLC SERPL QL: 2.93
HDLC SERPL-MCNC: 89 MG/DL (ref 40–60)
LDLC SERPL CALC-MCNC: 162 MG/DL (ref 0–100)
TRIGL SERPL-MCNC: 64 MG/DL (ref 0–150)
VLDLC SERPL-MCNC: 10 MG/DL (ref 5–40)

## 2023-06-01 PROCEDURE — 99214 OFFICE O/P EST MOD 30 MIN: CPT | Performed by: INTERNAL MEDICINE

## 2023-06-01 PROCEDURE — 80061 LIPID PANEL: CPT | Performed by: INTERNAL MEDICINE

## 2023-06-01 PROCEDURE — 36415 COLL VENOUS BLD VENIPUNCTURE: CPT | Performed by: INTERNAL MEDICINE

## 2023-06-01 RX ORDER — PANTOPRAZOLE SODIUM 40 MG/1
40 TABLET, DELAYED RELEASE ORAL DAILY
Qty: 90 TABLET | Refills: 3 | Status: SHIPPED | OUTPATIENT
Start: 2023-06-01

## 2023-06-01 NOTE — PROGRESS NOTES
Subjective   Waqas Odonnell is a 62 y.o. male.     Chief Complaint   Patient presents with   • Hyperlipidemia   • Hypothyroidism   • Anxiety         Hyperlipidemia  This is a chronic problem. The current episode started more than 1 year ago. The problem is controlled. Recent lipid tests were reviewed and are normal. Exacerbating diseases include hypothyroidism. There are no known factors aggravating his hyperlipidemia. Pertinent negatives include no chest pain or shortness of breath.   Hypothyroidism  This is a chronic problem. The current episode started more than 1 year ago. The problem occurs constantly. The problem has been unchanged. Pertinent negatives include no chest pain. The treatment provided significant relief.   Anxiety  Presents for follow-up visit. Patient reports no chest pain, palpitations or shortness of breath.       Heartburn  He complains of heartburn. He reports no chest pain or no wheezing. This is a chronic problem. The current episode started more than 1 year ago. The heartburn is of mild intensity. Risk factors include caffeine use. He has tried a histamine-2 antagonist for the symptoms. The treatment provided significant relief.        The following portions of the patient's history were reviewed and updated as appropriate: allergies, current medications, past social history and problem list.    Outpatient Medications Marked as Taking for the 6/1/23 encounter (Office Visit) with Brian Boo MD   Medication Sig Dispense Refill   • acyclovir (Zovirax) 400 MG tablet Take 1 tablet by mouth 2 (Two) Times a Day. 180 tablet 3   • famotidine (PEPCID) 40 MG tablet Take 1 tablet by mouth 2 (Two) Times a Day. 180 tablet 3   • Synthroid 175 MCG tablet TAKE 1 TABLET DAILY 90 tablet 3       Review of Systems   Respiratory: Negative for shortness of breath and wheezing.    Cardiovascular: Negative for chest pain, palpitations and leg swelling.   Gastrointestinal: Positive for heartburn. Negative for  abdominal distention, constipation and diarrhea.   Endocrine: Positive for cold intolerance.       Objective   Vitals:    06/01/23 1526   BP: 134/82   Pulse: 74   Resp: 16   Temp: 99.1 °F (37.3 °C)   SpO2: 96%          06/01/23  1526   Weight: 81.6 kg (179 lb 12.8 oz)    [unfilled]  Body mass index is 26.54 kg/m².      Physical Exam   Constitutional: He appears well-developed.   Neck: No thyromegaly present.   Cardiovascular: Normal rate, regular rhythm and normal heart sounds. Exam reveals no gallop.   No murmur heard.  Pulmonary/Chest: Effort normal and breath sounds normal. No respiratory distress. He has no wheezes. He has no rales.   Abdominal: Normal appearance.   Neurological: He is alert.         Problems Addressed this Visit        Cardiac and Vasculature    Hyperlipidemia - Primary (Chronic)       Endocrine and Metabolic    Hypothyroidism (Chronic)       Gastrointestinal Abdominal     Gastroesophageal reflux disease without esophagitis (Chronic)   Diagnoses       Codes Comments    Hyperlipidemia, unspecified hyperlipidemia type    -  Primary ICD-10-CM: E78.5  ICD-9-CM: 272.4     Hypothyroidism, unspecified type     ICD-10-CM: E03.9  ICD-9-CM: 244.9     Gastroesophageal reflux disease without esophagitis     ICD-10-CM: K21.9  ICD-9-CM: 530.81         Assessment & Plan   In for recheck of hyperlipidemia, GERD and hypothyroidism today June 2023.  His GERD is worse.  Requires Pepcid 40 mg twice daily and taking 2 Tums daily on top of that.  We will plan to switch pantoprazole 40 mg daily.  He is nearly having dysphagia and certainly spells of chest pain with eating.  If no response to the pantoprazole we will need to move onto an EGD.  Symptoms even sound a bit like achalasia.  Thyroid is under good control.  Annual lab work and annual exam December 2023..  He has previously failed to statins due to myalgias.  Had to stop his Zetia for the same reason.  Symptoms cleared up in about 2 weeks.  Currently off  of treatment.  He runs a high HDL so it is not likely he really needs to pursue treatment of lipids as aggressively as he has in the past.  He remains on Synthroid 175 MCG daily for his hypothyroidism.  Generally cold intolerant.  He had a CT calcium score in July 2020 which was 21 which was excellent.  Drinks 2 beers per day and knows he needs to cut that back regarding his GERD.  He is fasting today.      The above information was reviewed again today 06/01/23.  It continues to be accurate as reflected above and is unchanged.  History, physical and review of systems all reviewed and are unchanged.  Medications were reviewed today and continue the current dosing.         Dragon disclaimer:   Much of this encounter note is an electronic transcription/translation of spoken language to printed text. The electronic translation of spoken language may permit erroneous, or at times, nonsensical words or phrases to be inadvertently transcribed; Although I have reviewed the note for such errors, some may still exist.

## 2023-11-17 DIAGNOSIS — B00.9 HERPES: ICD-10-CM

## 2023-11-17 RX ORDER — ACYCLOVIR 400 MG/1
400 TABLET ORAL 2 TIMES DAILY
Qty: 180 TABLET | Refills: 3 | Status: SHIPPED | OUTPATIENT
Start: 2023-11-17

## 2023-12-15 ENCOUNTER — LAB (OUTPATIENT)
Dept: LAB | Facility: HOSPITAL | Age: 63
End: 2023-12-15
Payer: COMMERCIAL

## 2023-12-15 ENCOUNTER — OFFICE VISIT (OUTPATIENT)
Dept: INTERNAL MEDICINE | Facility: CLINIC | Age: 63
End: 2023-12-15
Payer: COMMERCIAL

## 2023-12-15 VITALS
TEMPERATURE: 97.7 F | RESPIRATION RATE: 18 BRPM | BODY MASS INDEX: 26.66 KG/M2 | HEIGHT: 69 IN | DIASTOLIC BLOOD PRESSURE: 70 MMHG | HEART RATE: 68 BPM | SYSTOLIC BLOOD PRESSURE: 120 MMHG | WEIGHT: 180 LBS | OXYGEN SATURATION: 98 %

## 2023-12-15 DIAGNOSIS — Z12.5 SCREENING FOR PROSTATE CANCER: ICD-10-CM

## 2023-12-15 DIAGNOSIS — E78.5 HYPERLIPIDEMIA, UNSPECIFIED HYPERLIPIDEMIA TYPE: Chronic | ICD-10-CM

## 2023-12-15 DIAGNOSIS — Z12.2 SCREENING FOR LUNG CANCER: ICD-10-CM

## 2023-12-15 DIAGNOSIS — E03.9 HYPOTHYROIDISM, UNSPECIFIED TYPE: Chronic | ICD-10-CM

## 2023-12-15 DIAGNOSIS — K21.9 GASTROESOPHAGEAL REFLUX DISEASE WITHOUT ESOPHAGITIS: Chronic | ICD-10-CM

## 2023-12-15 DIAGNOSIS — R73.02 IGT (IMPAIRED GLUCOSE TOLERANCE): ICD-10-CM

## 2023-12-15 DIAGNOSIS — Z00.00 ENCOUNTER FOR PREVENTIVE HEALTH EXAMINATION: Primary | ICD-10-CM

## 2023-12-15 LAB
ALBUMIN SERPL-MCNC: 4.9 G/DL (ref 3.5–5.2)
ALBUMIN/GLOB SERPL: 2.7 G/DL
ALP SERPL-CCNC: 62 U/L (ref 39–117)
ALT SERPL W P-5'-P-CCNC: 31 U/L (ref 1–41)
ANION GAP SERPL CALCULATED.3IONS-SCNC: 10 MMOL/L (ref 5–15)
AST SERPL-CCNC: 24 U/L (ref 1–40)
BASOPHILS # BLD AUTO: 0.02 10*3/MM3 (ref 0–0.2)
BASOPHILS NFR BLD AUTO: 0.3 % (ref 0–1.5)
BILIRUB SERPL-MCNC: 0.7 MG/DL (ref 0–1.2)
BUN SERPL-MCNC: 12 MG/DL (ref 8–23)
BUN/CREAT SERPL: 14.3 (ref 7–25)
CALCIUM SPEC-SCNC: 9.6 MG/DL (ref 8.6–10.5)
CHLORIDE SERPL-SCNC: 105 MMOL/L (ref 98–107)
CHOLEST SERPL-MCNC: 287 MG/DL (ref 0–200)
CO2 SERPL-SCNC: 23 MMOL/L (ref 22–29)
CREAT SERPL-MCNC: 0.84 MG/DL (ref 0.76–1.27)
DEPRECATED RDW RBC AUTO: 41 FL (ref 37–54)
EGFRCR SERPLBLD CKD-EPI 2021: 98 ML/MIN/1.73
EOSINOPHIL # BLD AUTO: 0.11 10*3/MM3 (ref 0–0.4)
EOSINOPHIL NFR BLD AUTO: 1.7 % (ref 0.3–6.2)
ERYTHROCYTE [DISTWIDTH] IN BLOOD BY AUTOMATED COUNT: 12 % (ref 12.3–15.4)
GLOBULIN UR ELPH-MCNC: 1.8 GM/DL
GLUCOSE SERPL-MCNC: 107 MG/DL (ref 65–99)
HBA1C MFR BLD: 6.2 % (ref 4.8–5.6)
HCT VFR BLD AUTO: 46.1 % (ref 37.5–51)
HDLC SERPL QL: 3.59
HDLC SERPL-MCNC: 80 MG/DL (ref 40–60)
HGB BLD-MCNC: 16.2 G/DL (ref 13–17.7)
IMM GRANULOCYTES # BLD AUTO: 0.02 10*3/MM3 (ref 0–0.05)
IMM GRANULOCYTES NFR BLD AUTO: 0.3 % (ref 0–0.5)
LDLC SERPL CALC-MCNC: 186 MG/DL (ref 0–100)
LYMPHOCYTES # BLD AUTO: 2.11 10*3/MM3 (ref 0.7–3.1)
LYMPHOCYTES NFR BLD AUTO: 31.9 % (ref 19.6–45.3)
MCH RBC QN AUTO: 32.9 PG (ref 26.6–33)
MCHC RBC AUTO-ENTMCNC: 35.1 G/DL (ref 31.5–35.7)
MCV RBC AUTO: 93.5 FL (ref 79–97)
MONOCYTES # BLD AUTO: 0.6 10*3/MM3 (ref 0.1–0.9)
MONOCYTES NFR BLD AUTO: 9.1 % (ref 5–12)
NEUTROPHILS NFR BLD AUTO: 3.75 10*3/MM3 (ref 1.7–7)
NEUTROPHILS NFR BLD AUTO: 56.7 % (ref 42.7–76)
NRBC BLD AUTO-RTO: 0 /100 WBC (ref 0–0.2)
PLATELET # BLD AUTO: 232 10*3/MM3 (ref 140–450)
PMV BLD AUTO: 10.3 FL (ref 6–12)
POTASSIUM SERPL-SCNC: 4.3 MMOL/L (ref 3.5–5.2)
PROT SERPL-MCNC: 6.7 G/DL (ref 6–8.5)
PSA SERPL-MCNC: 0.5 NG/ML (ref 0–4)
RBC # BLD AUTO: 4.93 10*6/MM3 (ref 4.14–5.8)
SODIUM SERPL-SCNC: 138 MMOL/L (ref 136–145)
T4 FREE SERPL-MCNC: 1.47 NG/DL (ref 0.93–1.7)
TRIGL SERPL-MCNC: 119 MG/DL (ref 0–150)
TSH SERPL DL<=0.05 MIU/L-ACNC: 1.85 UIU/ML (ref 0.27–4.2)
VLDLC SERPL-MCNC: 21 MG/DL (ref 5–40)
WBC NRBC COR # BLD AUTO: 6.61 10*3/MM3 (ref 3.4–10.8)

## 2023-12-15 PROCEDURE — 84439 ASSAY OF FREE THYROXINE: CPT | Performed by: INTERNAL MEDICINE

## 2023-12-15 PROCEDURE — 99396 PREV VISIT EST AGE 40-64: CPT | Performed by: INTERNAL MEDICINE

## 2023-12-15 PROCEDURE — 80050 GENERAL HEALTH PANEL: CPT | Performed by: INTERNAL MEDICINE

## 2023-12-15 PROCEDURE — 36415 COLL VENOUS BLD VENIPUNCTURE: CPT | Performed by: INTERNAL MEDICINE

## 2023-12-15 PROCEDURE — 83036 HEMOGLOBIN GLYCOSYLATED A1C: CPT | Performed by: INTERNAL MEDICINE

## 2023-12-15 PROCEDURE — 80061 LIPID PANEL: CPT | Performed by: INTERNAL MEDICINE

## 2023-12-15 PROCEDURE — G0103 PSA SCREENING: HCPCS | Performed by: INTERNAL MEDICINE

## 2023-12-15 NOTE — PROGRESS NOTES
Subjective   Waqas Odonnell is a 63 y.o. male.     Chief Complaint   Patient presents with    Annual Exam    Hyperlipidemia    Hypothyroidism    Shortness of Breath     Bending over tying shoes            History of Present Illness  In for annual preventative exam.  He gets about 6 hours of sleep at night.  Interrupted.  Feels rested.  Exercises 0 days/week but does heavy work every day.  Walks fairly aggressively 2 days/week.  Energy is okay.  Is been high strung.  Has one rental properties.  Does some push-ups and curls almost every day.  Works in the [employer] for [employer].  Energy is good.  Diet is not great.    Hyperlipidemia  This is a chronic problem. Exacerbating diseases include hypothyroidism. Associated symptoms include shortness of breath (with bending over only). Pertinent negatives include no chest pain or myalgias.   Hypothyroidism  This is a chronic problem. Associated symptoms include abdominal pain (heartburn controlled with Zantac) and headaches (occassional). Pertinent negatives include no arthralgias, chest pain, chills, coughing, diaphoresis, fatigue, fever, myalgias, nausea, neck pain, vomiting or weakness.   Shortness of Breath  This is a chronic problem. Associated symptoms include abdominal pain (heartburn controlled with Zantac) and headaches (occassional). Pertinent negatives include no chest pain, fever, leg swelling, neck pain, vomiting or wheezing.        The following portions of the patient's history were reviewed and updated as appropriate: allergies, current medications, past social history and problem list.    HISTORY  Outpatient Medications Marked as Taking for the 12/15/23 encounter (Office Visit) with Brian Boo MD   Medication Sig Dispense Refill    acyclovir (ZOVIRAX) 400 MG tablet TAKE 1 TABLET TWICE A  tablet 3    pantoprazole (PROTONIX) 40 MG EC tablet Take 1 tablet by mouth Daily. 90 tablet 3    Synthroid 175 MCG tablet TAKE 1 TABLET DAILY 90 tablet 3     Social  History     Socioeconomic History    Marital status:    Tobacco Use    Smoking status: Former     Packs/day: 0.10     Years: 11.00     Additional pack years: 0.00     Total pack years: 1.10     Types: Cigarettes     Start date:      Quit date:      Years since quittin.9    Smokeless tobacco: Never    Tobacco comments:     Weekend smoker   Vaping Use    Vaping Use: Never used   Substance and Sexual Activity    Alcohol use: Yes     Alcohol/week: 12.0 standard drinks of alcohol     Types: 12 Cans of beer per week     Comment: Beer: Weekends    Drug use: Not Currently     Family History   Problem Relation Age of Onset    Diabetes Mother     Heart attack Father     No Known Problems Sister     No Known Problems Brother     No Known Problems Daughter     No Known Problems Son     No Known Problems Brother      No past medical history on file.  No past surgical history on file.    Review of Systems   Constitutional:  Negative for chills, diaphoresis, fatigue, fever and unexpected weight change.   Respiratory:  Positive for shortness of breath (with bending over only). Negative for cough, chest tightness and wheezing.    Cardiovascular:  Negative for chest pain, palpitations and leg swelling.   Gastrointestinal:  Positive for abdominal pain (heartburn controlled with Zantac). Negative for anal bleeding, blood in stool, constipation, diarrhea, nausea, rectal pain and vomiting.   Endocrine: Negative for cold intolerance, heat intolerance and polyuria.   Genitourinary:  Negative for difficulty urinating, dysuria, frequency, hematuria and urgency.   Musculoskeletal:  Negative for arthralgias, back pain, myalgias and neck pain.   Allergic/Immunologic: Negative for environmental allergies.   Neurological:  Positive for headaches (occassional). Negative for dizziness, syncope, weakness and light-headedness.   Hematological:  Negative for adenopathy. Does not bruise/bleed easily.   Psychiatric/Behavioral:   Positive for dysphoric mood. Negative for confusion and sleep disturbance. The patient is nervous/anxious.        Objective   Vitals:    12/15/23 0930   BP: 120/70   Pulse: 68   Resp: 18   Temp: 97.7 °F (36.5 °C)   SpO2: 98%          12/15/23  0930   Weight: 81.6 kg (180 lb)    [unfilled]  Body mass index is 26.58 kg/m².      Physical Exam   Constitutional: He is oriented to person, place, and time. He appears well-developed.   HENT:   Head: Normocephalic and atraumatic.   Right Ear: Tympanic membrane and external ear normal.   Left Ear: Tympanic membrane and external ear normal.   Nose: Nose normal.   Eyes: Pupils are equal, round, and reactive to light. Conjunctivae are normal. No scleral icterus.   Neck: No JVD present. No thyromegaly present.   Cardiovascular: Normal rate and normal heart sounds. An irregular rhythm present. Exam reveals no gallop and no friction rub.   No murmur heard.  Pulmonary/Chest: Effort normal and breath sounds normal. No respiratory distress. He has no wheezes. He has no rales.   Abdominal: Soft. Normal appearance and bowel sounds are normal. He exhibits no distension and no mass. There is no abdominal tenderness. There is no rebound and no guarding. No hernia.   Genitourinary:    Prostate, testes, penis and rectum normal.     Musculoskeletal: Normal range of motion.   Lymphadenopathy:     He has no cervical adenopathy.   Neurological: He is alert and oriented to person, place, and time. He has normal reflexes. He displays normal reflexes.   Skin: Skin is warm and dry.   Psychiatric: His behavior is normal. Mood, judgment and thought content normal.   Nursing note and vitals reviewed.    Procedures        Problems Addressed this Visit          Cardiac and Vasculature    Hyperlipidemia (Chronic)       Endocrine and Metabolic    Hypothyroidism (Chronic)       Gastrointestinal Abdominal     Gastroesophageal reflux disease without esophagitis (Chronic)     Other Visit Diagnoses        Encounter for preventive health examination    -  Primary          Diagnoses         Codes Comments    Encounter for preventive health examination    -  Primary ICD-10-CM: Z00.00  ICD-9-CM: V70.0     Hyperlipidemia, unspecified hyperlipidemia type     ICD-10-CM: E78.5  ICD-9-CM: 272.4     Gastroesophageal reflux disease without esophagitis     ICD-10-CM: K21.9  ICD-9-CM: 530.81     Hypothyroidism, unspecified type     ICD-10-CM: E03.9  ICD-9-CM: 244.9           Assessment & Plan   In for annual preventive exam today 2023.  He has had 3 or 4 outbreaks of blisters on his left foot instep.  He always pops them when they occur because they are painful.  He had a history of genital herpes in the past but none since starting acyclovir.  He has only had 2 outbreaks.  Advised patient is okay to stop acyclovir at this point if he desires and we can always go back to it as needed.  Last outbreak was around 2022.  Overall health is been excellent.  He has hypothyroidism on replacement.  Hyperlipidemia.  GERD controlled on famotidine.  We'll plan on checking lipids every 6 months.  Annual lab today 2023 including CBC, CMP, lipids, PSA, TSH, free T4.  We'll monitor lipids in 6 month intervals.  Due for shingrix.  He's been intolerant to statins.  Tried Livalo, pravastatin, atorvastatin.  Also failed Zetia with arm pain.  He feels like he is hyper a lot of the time does not want to take medicine for it.  Has some anger issues.  Took care of his elderly mother was Alzheimer's disease at end-of-life and now his father is also  from Alzheimer's disease at end-of-life.  Would probably benefit from an SSRI.  He is pretty worked up and emotional today.  Due for Shingrix.  He declines rectal exam today.  Just wants a PSA.  He has a 80-pack-year smoking history.  2 packs a day for 40 years.  Quit at age 55.  That was about 8 years ago.  Will begin lung cancer screening with low-dose CT.  He is fasting  today.    Prevention counseling was performed today. The counseling performed was routine health maintenance topics including BMI and exercise.    The above information was reviewed again today 12/15/23.  It continues to be accurate as reflected above and is unchanged.  History, physical and review of systems all reviewed and are unchanged.  Medications were reviewed today and continue the current dosing.               Dragon disclaimer:   Much of this encounter note is an electronic transcription/translation of spoken language to printed text. The electronic translation of spoken language may permit erroneous, or at times, nonsensical words or phrases to be inadvertently transcribed; Although I have reviewed the note for such errors, some may still exist.

## 2023-12-16 PROBLEM — R73.02 IGT (IMPAIRED GLUCOSE TOLERANCE): Status: ACTIVE | Noted: 2023-12-16

## 2024-01-18 ENCOUNTER — TELEPHONE (OUTPATIENT)
Dept: INTERNAL MEDICINE | Facility: CLINIC | Age: 64
End: 2024-01-18
Payer: COMMERCIAL

## 2024-01-18 NOTE — TELEPHONE ENCOUNTER
Pt c/o herpes outbreak on tip of penis since last night so he took acyclovir 400 mg once last night & one today. Please advise what is the protocol for breakouts, since you discontinued daily acyclovir bid.    OK to leave complete  [phone].

## 2024-01-18 NOTE — TELEPHONE ENCOUNTER
For acute breakouts I would take the drug 4 times daily for 1 week.  If that breakouts are rare that will be fine.  If they occur with any regularity or too often for him I would go back to taking it prophylactically twice daily

## 2024-01-18 NOTE — TELEPHONE ENCOUNTER
Caller: Waqas Odonnell    Relationship: Self    Best call back number: [phone] (IF PATIENT DOESN'T ANSWER, HE ASKS TO PLEASE CALL AGAIN. 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     PLEASE ADVISE.

## 2024-02-02 ENCOUNTER — HOSPITAL ENCOUNTER (OUTPATIENT)
Dept: CT IMAGING | Facility: HOSPITAL | Age: 64
Discharge: HOME | End: 2024-02-02
Admitting: INTERNAL MEDICINE
Payer: COMMERCIAL

## 2024-02-02 DIAGNOSIS — Z12.2 SCREENING FOR LUNG CANCER: ICD-10-CM

## 2024-02-02 PROCEDURE — 71271 CT THORAX LUNG CANCER SCR C-: CPT

## 2024-02-05 ENCOUNTER — TELEPHONE (OUTPATIENT)
Dept: INTERNAL MEDICINE | Facility: CLINIC | Age: 64
End: 2024-02-05
Payer: COMMERCIAL

## 2024-02-05 DIAGNOSIS — B00.9 HERPES: ICD-10-CM

## 2024-02-05 NOTE — TELEPHONE ENCOUNTER
Spoke to patient, he is continue to have herpes lesions come up ever since stopping acyclovir. Last breakout resolved after taking week supply, but lesions returned a week after stopping acyclovir. Please advise.    Requests call back after 2:06PM.

## 2024-02-05 NOTE — TELEPHONE ENCOUNTER
Pt informed & verbalized understanding. Patient had other questions, but declined OV to discuss issues or additional questions. Patient was disappointed to not be able to discuss directly with provider.

## 2024-02-05 NOTE — TELEPHONE ENCOUNTER
I would recommend that he take acyclovir 400 mg twice daily for 1 year.  We can reassess at that time as to another trial off of medication.

## 2024-02-05 NOTE — TELEPHONE ENCOUNTER
Caller: Waqas Odonnell    Relationship: Self    Best call back number:     [phone]       What is the best time to reach you: ANY    Who are you requesting to speak with (clinical staff, provider,  specific staff member): PCP    Do you know the name of the person who called:     What was the call regarding: PATIENT WOULD LIKE A CALL BACK FROM  IN REGARDS TO A FEW DIFFERENT ISSUES    Is it okay if the provider responds through MyChart:

## 2024-03-14 ENCOUNTER — TELEPHONE (OUTPATIENT)
Dept: INTERNAL MEDICINE | Facility: CLINIC | Age: 64
End: 2024-03-14
Payer: COMMERCIAL

## 2024-03-14 NOTE — TELEPHONE ENCOUNTER
Caller: Waqas Odonnell    Relationship: Self    Best call back number:     [phone]    CALL AFTER 2 PM         What was the call regarding: PATIENT STATES THAT HE HAS HAD A LESION (BIRTH CHEKO) ON LEFT ARM AND IT IS GETTING CRUSTY. PATIENT HAS CALLED DERMATOLOGY AND SCHEDULED AN APPT. HE IS WANTING TO KNOW IF THERE ARE ANY RECORDS IN HIS CHART ABOUT IT TO SEE IF THERE ARE ANY MEASUREMENTS OF IT FROM BEFORE. PLEASE CALL AND ADVISE.

## 2024-03-14 NOTE — TELEPHONE ENCOUNTER
We do not really have any information on this.  Nonetheless the dermatologist should be able to give him good advice just by looking at it.

## 2024-05-22 RX ORDER — LEVOTHYROXINE SODIUM 175 MCG
TABLET ORAL
Qty: 90 TABLET | Refills: 1 | Status: SHIPPED | OUTPATIENT
Start: 2024-05-22

## 2024-05-22 RX ORDER — PANTOPRAZOLE SODIUM 40 MG/1
40 TABLET, DELAYED RELEASE ORAL DAILY
Qty: 90 TABLET | Refills: 1 | Status: SHIPPED | OUTPATIENT
Start: 2024-05-22

## 2024-06-14 ENCOUNTER — OFFICE VISIT (OUTPATIENT)
Dept: INTERNAL MEDICINE | Facility: CLINIC | Age: 64
End: 2024-06-14
Payer: COMMERCIAL

## 2024-06-14 ENCOUNTER — LAB (OUTPATIENT)
Dept: LAB | Facility: HOSPITAL | Age: 64
End: 2024-06-14
Payer: COMMERCIAL

## 2024-06-14 VITALS
RESPIRATION RATE: 18 BRPM | HEIGHT: 69 IN | BODY MASS INDEX: 26.66 KG/M2 | DIASTOLIC BLOOD PRESSURE: 68 MMHG | OXYGEN SATURATION: 98 % | HEART RATE: 66 BPM | SYSTOLIC BLOOD PRESSURE: 110 MMHG | WEIGHT: 180 LBS | TEMPERATURE: 98 F

## 2024-06-14 DIAGNOSIS — E03.9 HYPOTHYROIDISM, UNSPECIFIED TYPE: Chronic | ICD-10-CM

## 2024-06-14 DIAGNOSIS — E78.5 HYPERLIPIDEMIA, UNSPECIFIED HYPERLIPIDEMIA TYPE: Primary | Chronic | ICD-10-CM

## 2024-06-14 DIAGNOSIS — Z12.11 SCREENING FOR COLON CANCER: ICD-10-CM

## 2024-06-14 DIAGNOSIS — R73.02 IGT (IMPAIRED GLUCOSE TOLERANCE): ICD-10-CM

## 2024-06-14 LAB
CHOLEST SERPL-MCNC: 264 MG/DL (ref 0–200)
HDLC SERPL QL: 3.07
HDLC SERPL-MCNC: 86 MG/DL (ref 40–60)
LDLC SERPL CALC-MCNC: 160 MG/DL (ref 0–100)
TRIGL SERPL-MCNC: 104 MG/DL (ref 0–150)
VLDLC SERPL-MCNC: 18 MG/DL (ref 5–40)

## 2024-06-14 PROCEDURE — 36415 COLL VENOUS BLD VENIPUNCTURE: CPT | Performed by: INTERNAL MEDICINE

## 2024-06-14 PROCEDURE — 80061 LIPID PANEL: CPT | Performed by: INTERNAL MEDICINE

## 2024-06-14 PROCEDURE — 99213 OFFICE O/P EST LOW 20 MIN: CPT | Performed by: INTERNAL MEDICINE

## 2024-06-14 NOTE — PROGRESS NOTES
Subjective   Waqas Odonnell is a 63 y.o. male.     Chief Complaint   Patient presents with    Hyperlipidemia    Heartburn    Anxiety         Hyperlipidemia  This is a chronic problem. The current episode started more than 1 year ago. The problem is controlled. Recent lipid tests were reviewed and are normal. Exacerbating diseases include hypothyroidism. There are no known factors aggravating his hyperlipidemia. Pertinent negatives include no chest pain or shortness of breath.   Heartburn  He complains of heartburn. He reports no chest pain or no wheezing. This is a chronic problem. The current episode started more than 1 year ago. The heartburn is of mild intensity. Risk factors include caffeine use. He has tried a histamine-2 antagonist for the symptoms. The treatment provided significant relief.   Anxiety  Presents for follow-up visit.  Patient reports no chest pain, palpitations or shortness of breath.   Hypothyroidism  This is a chronic problem. The current episode started more than 1 year ago. The problem occurs constantly. The problem has been unchanged. Pertinent negatives include no chest pain. The treatment provided significant relief.        The following portions of the patient's history were reviewed and updated as appropriate: allergies, current medications, past social history and problem list.    Outpatient Medications Marked as Taking for the 6/14/24 encounter (Office Visit) with Brian Boo MD   Medication Sig Dispense Refill    acyclovir (ZOVIRAX) 400 MG tablet TAKE 1 TABLET TWICE A  tablet 3    levothyroxine (Synthroid) 175 MCG tablet TAKE 1 TABLET DAILY 90 tablet 1    pantoprazole (PROTONIX) 40 MG EC tablet TAKE 1 TABLET DAILY 90 tablet 1       Review of Systems   Respiratory:  Negative for shortness of breath and wheezing.    Cardiovascular:  Negative for chest pain, palpitations and leg swelling.   Gastrointestinal:  Positive for heartburn. Negative for abdominal distention,  constipation and diarrhea.   Endocrine: Positive for cold intolerance.       Objective   Vitals:    06/14/24 0851   BP: 110/68   Pulse: 66   Resp: 18   Temp: 98 °F (36.7 °C)   SpO2: 98%          06/14/24  0851   Weight: 81.6 kg (180 lb)    [unfilled]  Body mass index is 26.58 kg/m².      Physical Exam   Constitutional: He appears well-developed.   Neck: No thyromegaly present.   Cardiovascular: Normal rate, regular rhythm and normal heart sounds. Exam reveals no gallop.   No murmur heard.  Pulmonary/Chest: Effort normal and breath sounds normal. No respiratory distress. He has no wheezes. He has no rales.   Abdominal: Normal appearance.   Neurological: He is alert.         Problems Addressed this Visit          Cardiac and Vasculature    Hyperlipidemia - Primary (Chronic)       Endocrine and Metabolic    Hypothyroidism (Chronic)    IGT (impaired glucose tolerance)     Diagnoses         Codes Comments    Hyperlipidemia, unspecified hyperlipidemia type    -  Primary ICD-10-CM: E78.5  ICD-9-CM: 272.4     IGT (impaired glucose tolerance)     ICD-10-CM: R73.02  ICD-9-CM: 790.22     Hypothyroidism, unspecified type     ICD-10-CM: E03.9  ICD-9-CM: 244.9           Assessment & Plan   In for 6-month recheck of hyperlipidemia, GERD and hypothyroidism today June 2024.  He is now on pantoprazole for his GERD and it is working well.  He is no longer having dysphagia.  Thyroid is under good control.  Annual lab work and annual exam December 2024.  He has previously failed to statins due to myalgias.  Had to stop his Zetia for the same reason.  Symptoms cleared up in about 2 weeks.  Currently off of treatment.  He runs a high HDL so it is not likely he really needs to pursue treatment of lipids as aggressively as he has in the past.  He remains on Synthroid 175 MCG daily for his hypothyroidism.  Generally cold intolerant.  He had a CT calcium score in July 2020 which was 21 which was excellent.  Drinks 5-6 beers per day and  knows he needs to cut that back regarding his GERD.  He is having left elbow tenderness since he bumped it 2 months ago.  Tender right over that area.  This is lateral epicondylitis.  He will try a tennis elbow splint.  Will also schedule a colonoscopy.  He is 3 years out from a Cologuard but would like to switch to colonoscopy this time since he may be retiring soon.  He is fasting today.      The above information was reviewed again today 06/14/24.  It continues to be accurate as reflected above and is unchanged.  History, physical and review of systems all reviewed and are unchanged.  Medications were reviewed today and continue the current dosing.           Dragon disclaimer:   Much of this encounter note is an electronic transcription/translation of spoken language to printed text. The electronic translation of spoken language may permit erroneous, or at times, nonsensical words or phrases to be inadvertently transcribed; Although I have reviewed the note for such errors, some may still exist.

## 2024-10-07 ENCOUNTER — TELEPHONE (OUTPATIENT)
Dept: INTERNAL MEDICINE | Facility: CLINIC | Age: 64
End: 2024-10-07
Payer: COMMERCIAL

## 2024-10-07 NOTE — TELEPHONE ENCOUNTER
Caller:  JANI WATTERS     Relationship to patient: SELF     Best call back number:  PATIENT STATES HE CAN BE CALLED ANYTIME AFTER 3:30 (AND CAN ALSO LEAVE A DETAILED VOICE MESSAGE).        Patient is needing: PATIENT WANTS TO KNOW IF HE HAS HAD AN EGD FOR THE PAPERWORK HE IS DOING FOR HIS COLONOSCOPY.  PATIENT STATES HE ALSO HAS CHOKING AND THINKS HE NEEDS TO HAVE AN ENDOSCOPY AND WANTS TO KNOW IF DR. VIVAS SHOULD SEND AN ORDER FOR HIM TO GET THAT DONE AT THE SAME TIME AS THE COLONOSCOPY.  PATIENT ALSO WANTS TO KNOW WHO DR. VIVAS RECOMMENDED HE GO TO THE LAST TIME HE SAW DR. VIVAS.   PLEASE CALL THE PATIENT TO ANSWER QUESTIONS.

## 2024-10-07 NOTE — TELEPHONE ENCOUNTER
Patient plans colonoscopy with Dr. Mooney.  Lets call their office and let them know that patient also needs an EGD at the same time.  He is having some problems with dysphagia.

## 2024-10-08 NOTE — TELEPHONE ENCOUNTER
Spoke with Dr. Watson office.  Patient needs to be seen in office for an EGD consultation.  Once he is seen and the decision is made by Dr. Mooney that an EGD is needed, they will schedule the c-scope and EGD for the same time.  Patient informed and verbalized understanding.

## 2024-11-21 RX ORDER — LEVOTHYROXINE SODIUM 175 MCG
TABLET ORAL
Qty: 90 TABLET | Refills: 1 | Status: SHIPPED | OUTPATIENT
Start: 2024-11-21

## 2024-11-22 ENCOUNTER — OFFICE (OUTPATIENT)
Age: 64
End: 2024-11-22
Payer: COMMERCIAL

## 2024-11-22 ENCOUNTER — OFFICE (OUTPATIENT)
Dept: URBAN - METROPOLITAN AREA CLINIC 76 | Facility: CLINIC | Age: 64
End: 2024-11-22
Payer: COMMERCIAL

## 2024-11-22 VITALS
SYSTOLIC BLOOD PRESSURE: 134 MMHG | OXYGEN SATURATION: 96 % | WEIGHT: 184 LBS | HEART RATE: 64 BPM | DIASTOLIC BLOOD PRESSURE: 84 MMHG | SYSTOLIC BLOOD PRESSURE: 134 MMHG | HEART RATE: 64 BPM | OXYGEN SATURATION: 96 % | SYSTOLIC BLOOD PRESSURE: 134 MMHG | OXYGEN SATURATION: 96 % | DIASTOLIC BLOOD PRESSURE: 84 MMHG | SYSTOLIC BLOOD PRESSURE: 134 MMHG | WEIGHT: 184 LBS | OXYGEN SATURATION: 96 % | OXYGEN SATURATION: 96 % | DIASTOLIC BLOOD PRESSURE: 84 MMHG | WEIGHT: 184 LBS | OXYGEN SATURATION: 96 % | HEIGHT: 69 IN | HEART RATE: 64 BPM | SYSTOLIC BLOOD PRESSURE: 134 MMHG | WEIGHT: 184 LBS | HEIGHT: 69 IN | SYSTOLIC BLOOD PRESSURE: 134 MMHG | DIASTOLIC BLOOD PRESSURE: 84 MMHG | HEART RATE: 64 BPM | HEART RATE: 64 BPM | SYSTOLIC BLOOD PRESSURE: 134 MMHG | DIASTOLIC BLOOD PRESSURE: 84 MMHG | HEIGHT: 69 IN | DIASTOLIC BLOOD PRESSURE: 84 MMHG | HEIGHT: 69 IN | WEIGHT: 184 LBS | WEIGHT: 184 LBS | HEIGHT: 69 IN | HEART RATE: 64 BPM | OXYGEN SATURATION: 96 % | DIASTOLIC BLOOD PRESSURE: 84 MMHG | HEIGHT: 69 IN | HEIGHT: 69 IN | WEIGHT: 184 LBS | HEART RATE: 64 BPM

## 2024-11-22 DIAGNOSIS — K21.9 GASTRO-ESOPHAGEAL REFLUX DISEASE WITHOUT ESOPHAGITIS: ICD-10-CM

## 2024-11-22 DIAGNOSIS — R13.10 DYSPHAGIA, UNSPECIFIED: ICD-10-CM

## 2024-11-22 PROCEDURE — 99203 OFFICE O/P NEW LOW 30 MIN: CPT | Performed by: NURSE PRACTITIONER

## 2024-11-27 DIAGNOSIS — B00.9 HERPES: ICD-10-CM

## 2024-11-27 RX ORDER — ACYCLOVIR 400 MG/1
400 TABLET ORAL 2 TIMES DAILY
Qty: 180 TABLET | Refills: 3 | Status: SHIPPED | OUTPATIENT
Start: 2024-11-27

## 2024-12-10 VITALS
RESPIRATION RATE: 10 BRPM | RESPIRATION RATE: 8 BRPM | OXYGEN SATURATION: 96 % | SYSTOLIC BLOOD PRESSURE: 145 MMHG | HEART RATE: 80 BPM | RESPIRATION RATE: 20 BRPM | OXYGEN SATURATION: 99 % | SYSTOLIC BLOOD PRESSURE: 103 MMHG | WEIGHT: 174 LBS | DIASTOLIC BLOOD PRESSURE: 62 MMHG | SYSTOLIC BLOOD PRESSURE: 154 MMHG | DIASTOLIC BLOOD PRESSURE: 83 MMHG | HEART RATE: 87 BPM | HEART RATE: 73 BPM | OXYGEN SATURATION: 97 % | DIASTOLIC BLOOD PRESSURE: 82 MMHG | DIASTOLIC BLOOD PRESSURE: 90 MMHG | SYSTOLIC BLOOD PRESSURE: 99 MMHG | OXYGEN SATURATION: 87 % | SYSTOLIC BLOOD PRESSURE: 150 MMHG | RESPIRATION RATE: 9 BRPM | OXYGEN SATURATION: 91 % | DIASTOLIC BLOOD PRESSURE: 71 MMHG | HEART RATE: 64 BPM | OXYGEN SATURATION: 98 % | SYSTOLIC BLOOD PRESSURE: 110 MMHG | DIASTOLIC BLOOD PRESSURE: 93 MMHG | OXYGEN SATURATION: 95 % | RESPIRATION RATE: 19 BRPM | HEART RATE: 74 BPM | RESPIRATION RATE: 15 BRPM | DIASTOLIC BLOOD PRESSURE: 75 MMHG | SYSTOLIC BLOOD PRESSURE: 113 MMHG | DIASTOLIC BLOOD PRESSURE: 73 MMHG | SYSTOLIC BLOOD PRESSURE: 129 MMHG | SYSTOLIC BLOOD PRESSURE: 105 MMHG | DIASTOLIC BLOOD PRESSURE: 79 MMHG | OXYGEN SATURATION: 93 % | DIASTOLIC BLOOD PRESSURE: 66 MMHG | OXYGEN SATURATION: 89 % | DIASTOLIC BLOOD PRESSURE: 96 MMHG | HEART RATE: 72 BPM | HEART RATE: 76 BPM | DIASTOLIC BLOOD PRESSURE: 77 MMHG | TEMPERATURE: 97.3 F | SYSTOLIC BLOOD PRESSURE: 163 MMHG | SYSTOLIC BLOOD PRESSURE: 123 MMHG | RESPIRATION RATE: 5 BRPM | HEART RATE: 82 BPM | HEART RATE: 81 BPM | SYSTOLIC BLOOD PRESSURE: 108 MMHG | SYSTOLIC BLOOD PRESSURE: 146 MMHG | HEART RATE: 71 BPM | HEART RATE: 65 BPM | RESPIRATION RATE: 21 BRPM | RESPIRATION RATE: 16 BRPM | RESPIRATION RATE: 12 BRPM | HEIGHT: 69 IN | RESPIRATION RATE: 17 BRPM | SYSTOLIC BLOOD PRESSURE: 115 MMHG | HEART RATE: 78 BPM | TEMPERATURE: 96.9 F

## 2024-12-10 DIAGNOSIS — B00.9 HERPES: ICD-10-CM

## 2024-12-13 ENCOUNTER — OFFICE (OUTPATIENT)
Dept: URBAN - METROPOLITAN AREA PATHOLOGY 4 | Facility: PATHOLOGY | Age: 64
End: 2024-12-13
Payer: COMMERCIAL

## 2024-12-13 ENCOUNTER — AMBULATORY SURGICAL CENTER (OUTPATIENT)
Dept: URBAN - METROPOLITAN AREA SURGERY 17 | Facility: SURGERY | Age: 64
End: 2024-12-13
Payer: COMMERCIAL

## 2024-12-13 DIAGNOSIS — K29.70 GASTRITIS, UNSPECIFIED, WITHOUT BLEEDING: ICD-10-CM

## 2024-12-13 DIAGNOSIS — Z12.11 ENCOUNTER FOR SCREENING FOR MALIGNANT NEOPLASM OF COLON: ICD-10-CM

## 2024-12-13 DIAGNOSIS — K22.2 ESOPHAGEAL OBSTRUCTION: ICD-10-CM

## 2024-12-13 DIAGNOSIS — D12.3 BENIGN NEOPLASM OF TRANSVERSE COLON: ICD-10-CM

## 2024-12-13 DIAGNOSIS — K63.5 POLYP OF COLON: ICD-10-CM

## 2024-12-13 DIAGNOSIS — D12.2 BENIGN NEOPLASM OF ASCENDING COLON: ICD-10-CM

## 2024-12-13 DIAGNOSIS — K31.89 OTHER DISEASES OF STOMACH AND DUODENUM: ICD-10-CM

## 2024-12-13 DIAGNOSIS — R13.10 DYSPHAGIA, UNSPECIFIED: ICD-10-CM

## 2024-12-13 DIAGNOSIS — K21.9 GASTRO-ESOPHAGEAL REFLUX DISEASE WITHOUT ESOPHAGITIS: ICD-10-CM

## 2024-12-13 LAB
GI HISTOLOGY: A. STOMACH ANTRUM: (no result)
GI HISTOLOGY: B. MID-DESCENDING COLON: (no result)
GI HISTOLOGY: C. PROXIMAL ASCENDING COLON: (no result)
GI HISTOLOGY: D. DISTAL ASCENDING COLON: (no result)
GI HISTOLOGY: E. HEPATIC FLEXURE: (no result)
GI HISTOLOGY: PDF REPORT: (no result)

## 2024-12-13 PROCEDURE — 43450 DILATE ESOPHAGUS 1/MULT PASS: CPT | Performed by: INTERNAL MEDICINE

## 2024-12-13 PROCEDURE — 88342 IMHCHEM/IMCYTCHM 1ST ANTB: CPT | Performed by: PATHOLOGY

## 2024-12-13 PROCEDURE — 88305 TISSUE EXAM BY PATHOLOGIST: CPT | Performed by: PATHOLOGY

## 2024-12-13 PROCEDURE — 45385 COLONOSCOPY W/LESION REMOVAL: CPT | Mod: 33 | Performed by: INTERNAL MEDICINE

## 2024-12-13 PROCEDURE — 43239 EGD BIOPSY SINGLE/MULTIPLE: CPT | Performed by: INTERNAL MEDICINE

## 2024-12-13 RX ADMIN — SIMETHICONE 180: 20 EMULSION ORAL at 15:00

## 2024-12-16 RX ORDER — PANTOPRAZOLE SODIUM 40 MG/1
40 TABLET, DELAYED RELEASE ORAL DAILY
Qty: 90 TABLET | Refills: 1 | Status: SHIPPED | OUTPATIENT
Start: 2024-12-16

## 2024-12-16 RX ORDER — ACYCLOVIR 400 MG/1
400 TABLET ORAL 2 TIMES DAILY
Qty: 180 TABLET | Refills: 3 | Status: SHIPPED | OUTPATIENT
Start: 2024-12-16

## 2024-12-16 NOTE — TELEPHONE ENCOUNTER
Caller: Waqas Odonnell    Relationship: Self    Best call back number: [phone]     Requested Prescriptions:   Requested Prescriptions     Pending Prescriptions Disp Refills    pantoprazole (PROTONIX) 40 MG EC tablet [Pharmacy Med Name: PANTOPRAZOLE TAB 40MG DR] 90 tablet 1     Sig: TAKE 1 TABLET DAILY    acyclovir (ZOVIRAX) 400 MG tablet [Pharmacy Med Name: ACYCLOVIR TAB 400MG] 180 tablet 3     Sig: TAKE 1 TABLET TWICE A DAY        Pharmacy where request should be sent: [facility] - [address] - [address] [facility] AT [address]AL TO [facility] - [phone]  - [phone] FX     Last office visit with prescribing clinician: 6/14/2024   Last telemedicine visit with prescribing clinician: Visit date not found   Next office visit with prescribing clinician: 12/17/2024     Additional details provided by patient: OUT OF BOTH MEDICATIONS.     Does the patient have less than a 3 day supply:  [x] Yes  [] No    Would you like a call back once the refill request has been completed: [] Yes [x] No    If the office needs to give you a call back, can they leave a voicemail: [] Yes [x] No    Irma Isaacs Rep   12/16/24 09:52 EST

## 2024-12-18 PROBLEM — Z86.0101 HISTORY OF ADENOMATOUS POLYP OF COLON: Status: ACTIVE | Noted: 2024-12-18

## 2024-12-31 ENCOUNTER — LAB (OUTPATIENT)
Dept: LAB | Facility: HOSPITAL | Age: 64
End: 2024-12-31
Payer: COMMERCIAL

## 2024-12-31 ENCOUNTER — OFFICE VISIT (OUTPATIENT)
Dept: INTERNAL MEDICINE | Facility: CLINIC | Age: 64
End: 2024-12-31
Payer: COMMERCIAL

## 2024-12-31 ENCOUNTER — TELEPHONE (OUTPATIENT)
Dept: INTERNAL MEDICINE | Facility: CLINIC | Age: 64
End: 2024-12-31

## 2024-12-31 VITALS
OXYGEN SATURATION: 97 % | DIASTOLIC BLOOD PRESSURE: 74 MMHG | TEMPERATURE: 97.8 F | BODY MASS INDEX: 26.66 KG/M2 | WEIGHT: 180 LBS | RESPIRATION RATE: 18 BRPM | HEART RATE: 67 BPM | SYSTOLIC BLOOD PRESSURE: 130 MMHG | HEIGHT: 69 IN

## 2024-12-31 DIAGNOSIS — R73.02 IGT (IMPAIRED GLUCOSE TOLERANCE): Chronic | ICD-10-CM

## 2024-12-31 DIAGNOSIS — E03.9 HYPOTHYROIDISM, UNSPECIFIED TYPE: Chronic | ICD-10-CM

## 2024-12-31 DIAGNOSIS — Z00.00 ENCOUNTER FOR PREVENTIVE HEALTH EXAMINATION: Primary | ICD-10-CM

## 2024-12-31 DIAGNOSIS — Z12.5 SCREENING FOR PROSTATE CANCER: ICD-10-CM

## 2024-12-31 DIAGNOSIS — E78.5 HYPERLIPIDEMIA, UNSPECIFIED HYPERLIPIDEMIA TYPE: Chronic | ICD-10-CM

## 2024-12-31 LAB
ALBUMIN SERPL-MCNC: 4.6 G/DL (ref 3.5–5.2)
ALBUMIN/GLOB SERPL: 1.6 G/DL
ALP SERPL-CCNC: 76 U/L (ref 39–117)
ALT SERPL W P-5'-P-CCNC: 24 U/L (ref 1–41)
ANION GAP SERPL CALCULATED.3IONS-SCNC: 8.7 MMOL/L (ref 5–15)
AST SERPL-CCNC: 23 U/L (ref 1–40)
BASOPHILS # BLD AUTO: 0.02 10*3/MM3 (ref 0–0.2)
BASOPHILS NFR BLD AUTO: 0.3 % (ref 0–1.5)
BILIRUB SERPL-MCNC: 0.7 MG/DL (ref 0–1.2)
BUN SERPL-MCNC: 12 MG/DL (ref 8–23)
BUN/CREAT SERPL: 11.9 (ref 7–25)
CALCIUM SPEC-SCNC: 9.5 MG/DL (ref 8.6–10.5)
CHLORIDE SERPL-SCNC: 106 MMOL/L (ref 98–107)
CHOLEST SERPL-MCNC: 287 MG/DL (ref 0–200)
CO2 SERPL-SCNC: 27.3 MMOL/L (ref 22–29)
CREAT SERPL-MCNC: 1.01 MG/DL (ref 0.76–1.27)
DEPRECATED RDW RBC AUTO: 41.2 FL (ref 37–54)
EGFRCR SERPLBLD CKD-EPI 2021: 83 ML/MIN/1.73
EOSINOPHIL # BLD AUTO: 0.23 10*3/MM3 (ref 0–0.4)
EOSINOPHIL NFR BLD AUTO: 4 % (ref 0.3–6.2)
ERYTHROCYTE [DISTWIDTH] IN BLOOD BY AUTOMATED COUNT: 12 % (ref 12.3–15.4)
GLOBULIN UR ELPH-MCNC: 2.8 GM/DL
GLUCOSE SERPL-MCNC: 119 MG/DL (ref 65–99)
HBA1C MFR BLD: 5.9 % (ref 4.8–5.6)
HCT VFR BLD AUTO: 46.9 % (ref 37.5–51)
HDLC SERPL QL: 4.48
HDLC SERPL-MCNC: 64 MG/DL (ref 40–60)
HGB BLD-MCNC: 16.1 G/DL (ref 13–17.7)
IMM GRANULOCYTES # BLD AUTO: 0.02 10*3/MM3 (ref 0–0.05)
IMM GRANULOCYTES NFR BLD AUTO: 0.3 % (ref 0–0.5)
LDLC SERPL CALC-MCNC: 204 MG/DL (ref 0–100)
LYMPHOCYTES # BLD AUTO: 2.21 10*3/MM3 (ref 0.7–3.1)
LYMPHOCYTES NFR BLD AUTO: 38.6 % (ref 19.6–45.3)
MCH RBC QN AUTO: 31.9 PG (ref 26.6–33)
MCHC RBC AUTO-ENTMCNC: 34.3 G/DL (ref 31.5–35.7)
MCV RBC AUTO: 93.1 FL (ref 79–97)
MONOCYTES # BLD AUTO: 0.67 10*3/MM3 (ref 0.1–0.9)
MONOCYTES NFR BLD AUTO: 11.7 % (ref 5–12)
NEUTROPHILS NFR BLD AUTO: 2.57 10*3/MM3 (ref 1.7–7)
NEUTROPHILS NFR BLD AUTO: 45.1 % (ref 42.7–76)
NRBC BLD AUTO-RTO: 0 /100 WBC (ref 0–0.2)
PLATELET # BLD AUTO: 275 10*3/MM3 (ref 140–450)
PMV BLD AUTO: 10.4 FL (ref 6–12)
POTASSIUM SERPL-SCNC: 5.2 MMOL/L (ref 3.5–5.2)
PROT SERPL-MCNC: 7.4 G/DL (ref 6–8.5)
PSA SERPL-MCNC: 0.43 NG/ML (ref 0–4)
RBC # BLD AUTO: 5.04 10*6/MM3 (ref 4.14–5.8)
SODIUM SERPL-SCNC: 142 MMOL/L (ref 136–145)
T4 FREE SERPL-MCNC: 1.4 NG/DL (ref 0.92–1.68)
TRIGL SERPL-MCNC: 111 MG/DL (ref 0–150)
TSH SERPL DL<=0.05 MIU/L-ACNC: 2.99 UIU/ML (ref 0.27–4.2)
VLDLC SERPL-MCNC: 19 MG/DL (ref 5–40)
WBC NRBC COR # BLD AUTO: 5.72 10*3/MM3 (ref 3.4–10.8)

## 2024-12-31 PROCEDURE — 80050 GENERAL HEALTH PANEL: CPT | Performed by: INTERNAL MEDICINE

## 2024-12-31 PROCEDURE — 83036 HEMOGLOBIN GLYCOSYLATED A1C: CPT | Performed by: INTERNAL MEDICINE

## 2024-12-31 PROCEDURE — G0103 PSA SCREENING: HCPCS | Performed by: INTERNAL MEDICINE

## 2024-12-31 PROCEDURE — 99396 PREV VISIT EST AGE 40-64: CPT | Performed by: INTERNAL MEDICINE

## 2024-12-31 PROCEDURE — 36415 COLL VENOUS BLD VENIPUNCTURE: CPT | Performed by: INTERNAL MEDICINE

## 2024-12-31 PROCEDURE — 80061 LIPID PANEL: CPT | Performed by: INTERNAL MEDICINE

## 2024-12-31 PROCEDURE — 84439 ASSAY OF FREE THYROXINE: CPT | Performed by: INTERNAL MEDICINE

## 2024-12-31 RX ORDER — PANTOPRAZOLE SODIUM 40 MG/1
40 TABLET, DELAYED RELEASE ORAL 2 TIMES DAILY
Qty: 180 TABLET | Refills: 1 | Status: SHIPPED | OUTPATIENT
Start: 2024-12-31

## 2024-12-31 NOTE — TELEPHONE ENCOUNTER
Pt would like to know if you can change his RX for pantoprazole (PROTONIX) 40 MG EC tablet  can be changed to 2 a day

## 2024-12-31 NOTE — PROGRESS NOTES
Subjective   Waqas Odonnell is a 64 y.o. male.     Chief Complaint   Patient presents with    Annual Exam    Hyperlipidemia    Hypothyroidism    Hyperglycemia         History of Present Illness  In for annual preventative exam.  He gets about 3-5 hours of sleep at night.  Interrupted.  Feels rested.  Exercises 2-3 days/week with weights.  Walks fairly aggressively 1-2 days/week.  Energy is okay.  Is been high strung.  Has one rental properties.  Works in the [employer] for [employer].  Energy is good.  Diet is not great.    Hyperlipidemia  This is a chronic problem. The current episode started more than 1 year ago. Exacerbating diseases include hypothyroidism. Associated symptoms include shortness of breath (with bending over only). Pertinent negatives include no chest pain or myalgias.   Hypothyroidism  Presents for follow-up visit. Symptoms include anxiety and cold intolerance. Patient reports no constipation, diaphoresis, diarrhea, fatigue, heat intolerance or palpitations. His past medical history is significant for hyperlipidemia.   Hyperglycemia  Symptoms are chronic.   Symptoms include abdominal pain (heartburn controlled with Zantac).    Pertinent negative symptoms include no chest pain, no chills, no cough, no diaphoresis, no fatigue, no fever, no headaches (occassional), no myalgias, no nausea, no neck pain, no dysuria, no vomiting and no weakness.   Shortness of Breath  This is a chronic problem. Associated symptoms include abdominal pain (heartburn controlled with Zantac). Pertinent negatives include no chest pain, fever, headaches (occassional), leg swelling, neck pain, vomiting or wheezing.        The following portions of the patient's history were reviewed and updated as appropriate: allergies, current medications, past social history and problem list.    HISTORY  Outpatient Medications Marked as Taking for the 12/31/24 encounter (Office Visit) with Brian Boo MD   Medication Sig Dispense Refill     acyclovir (ZOVIRAX) 400 MG tablet TAKE 1 TABLET TWICE A  tablet 3    pantoprazole (PROTONIX) 40 MG EC tablet TAKE 1 TABLET DAILY 90 tablet 1    Synthroid 175 MCG tablet TAKE 1 TABLET DAILY 90 tablet 1     Social History     Socioeconomic History    Marital status:    Tobacco Use    Smoking status: Former     Current packs/day: 0.00     Average packs/day: 0.1 packs/day for 11.0 years (1.1 ttl pk-yrs)     Types: Cigarettes     Start date:      Quit date:      Years since quittin.0    Smokeless tobacco: Never    Tobacco comments:     Weekend smoker   Vaping Use    Vaping status: Never Used   Substance and Sexual Activity    Alcohol use: Yes     Alcohol/week: 12.0 standard drinks of alcohol     Types: 12 Cans of beer per week     Comment: Beer: Weekends    Drug use: Not Currently     Family History   Problem Relation Age of Onset    Diabetes Mother     Heart attack Father     No Known Problems Sister     No Known Problems Brother     No Known Problems Daughter     No Known Problems Son     No Known Problems Brother      History reviewed. No pertinent past medical history.  History reviewed. No pertinent surgical history.    Review of Systems   Constitutional:  Negative for chills, diaphoresis, fatigue, fever and unexpected weight change.   Respiratory:  Positive for shortness of breath (with bending over only). Negative for cough, chest tightness and wheezing.    Cardiovascular:  Negative for chest pain, palpitations and leg swelling.   Gastrointestinal:  Positive for abdominal pain (heartburn controlled with Zantac). Negative for anal bleeding, blood in stool, constipation, diarrhea, nausea, rectal pain and vomiting.   Endocrine: Positive for cold intolerance. Negative for heat intolerance and polyuria.   Genitourinary:  Negative for difficulty urinating, dysuria, frequency, hematuria and urgency.   Musculoskeletal:  Negative for arthralgias, back pain, myalgias and neck pain.    Allergic/Immunologic: Negative for environmental allergies.   Neurological:  Negative for dizziness, syncope, weakness, light-headedness and headaches (occassional).   Hematological:  Negative for adenopathy. Does not bruise/bleed easily.   Psychiatric/Behavioral:  Positive for dysphoric mood. Negative for confusion and sleep disturbance. The patient is nervous/anxious.        Objective   Vitals:    12/31/24 0759   BP: 130/74   Pulse: 67   Resp: 18   Temp: 97.8 °F (36.6 °C)   SpO2: 97%          12/31/24  0759   Weight: 81.6 kg (180 lb)    [unfilled]  Body mass index is 26.58 kg/m².      Physical Exam   Constitutional: He is oriented to person, place, and time. He appears well-developed.   HENT:   Head: Normocephalic and atraumatic.   Right Ear: Tympanic membrane and external ear normal.   Left Ear: Tympanic membrane and external ear normal.   Nose: Nose normal.   Eyes: Pupils are equal, round, and reactive to light. Conjunctivae are normal. No scleral icterus.   Neck: No JVD present. No thyromegaly present.   Cardiovascular: Normal rate and normal heart sounds. An irregular rhythm present. Exam reveals no gallop and no friction rub.   No murmur heard.  Pulmonary/Chest: Effort normal and breath sounds normal. No respiratory distress. He has no wheezes. He has no rales.   Abdominal: Soft. Normal appearance and bowel sounds are normal. He exhibits no distension and no mass. There is no abdominal tenderness. There is no rebound and no guarding. No hernia.   Genitourinary:    Prostate, testes, penis and rectum normal.     Musculoskeletal: Normal range of motion.   Lymphadenopathy:     He has no cervical adenopathy.   Neurological: He is alert and oriented to person, place, and time. He has normal reflexes. He displays normal reflexes.   Skin: Skin is warm and dry.   Psychiatric: His behavior is normal. Mood, judgment and thought content normal.   Nursing note and vitals reviewed.    Procedures        Problems  Addressed this Visit          Cardiac and Vasculature    Hyperlipidemia (Chronic)       Endocrine and Metabolic    IGT (impaired glucose tolerance) (Chronic)    Hypothyroidism (Chronic)     Other Visit Diagnoses       Encounter for preventive health examination    -  Primary          Diagnoses         Codes Comments    Encounter for preventive health examination    -  Primary ICD-10-CM: Z00.00  ICD-9-CM: V70.0     Hyperlipidemia, unspecified hyperlipidemia type     ICD-10-CM: E78.5  ICD-9-CM: 272.4     IGT (impaired glucose tolerance)     ICD-10-CM: R73.02  ICD-9-CM: 790.22     Hypothyroidism, unspecified type     ICD-10-CM: E03.9  ICD-9-CM: 244.9           Assessment & Plan   In for annual preventive exam today December 2024.  He has had 3 or 4 outbreaks of blisters on his left foot instep.  He always pops them when they occur because they are painful.  He had a history of genital herpes in the past but none since starting acyclovir.  He has only had 2 outbreaks.  Advised patient is okay to stop acyclovir at this point if he desires and we can always go back to it as needed.  Last outbreak was around September 2024 when he ran out of him acyclovir.  Overall health is been excellent.  He has hypothyroidism on replacement.  Hyperlipidemia.  GERD controlled on famotidine.  We'll plan on checking lipids every 6 months.  Annual lab today December 2024 including CBC, CMP, lipids, PSA, TSH, free T4.  We'll monitor lipids in 6 month intervals.  Due for shingrix.  He's been intolerant to statins.  Tried Livalo, pravastatin, atorvastatin.  Also failed Zetia with arm pain.  He feels like he is hyper a lot of the time does not want to take medicine for it.  Has some anger issues.  Would probably benefit from an SSRI.  He declines rectal exam today.  Just wants a PSA.  He has a 80-pack-year smoking history.  2 packs a day for 40 years.  Quit at age 55.  That was about 9 years ago.  Will begin lung cancer screening with low-dose  CT.  He is fasting today.    Prevention counseling was performed today. The counseling performed was routine health maintenance topics including BMI and exercise.    The above information was reviewed again today 12/31/24.  It continues to be accurate as reflected above and is unchanged.  History, physical and review of systems all reviewed and are unchanged.  Medications were reviewed today and continue the current dosing.           Pedro disclaimer:   Much of this encounter note is an electronic transcription/translation of spoken language to printed text. The electronic translation of spoken language may permit erroneous, or at times, nonsensical words or phrases to be inadvertently transcribed; Although I have reviewed the note for such errors, some may still exist.

## 2025-01-30 ENCOUNTER — TELEPHONE (OUTPATIENT)
Dept: INTERNAL MEDICINE | Facility: CLINIC | Age: 65
End: 2025-01-30

## 2025-01-30 NOTE — TELEPHONE ENCOUNTER
Caller: Waqas Odonnell    Relationship to patient: Self    Best call back number: [phone]      Patient is needing:     PATIENT CALLED IN STATING THAT HE GOT LAB WORK DONE AT [facility] ON 1.24.25.     THEY ARE SUPPOSED TO BE SENDING THEM OVER TO 'S OFFICE AND GIVING RESULTS TO PATIENT.     ALSO CAN THE LABS BE MAILED FROM 12.31.24 THAT WERE DONE IN OUR OFFICE.     PLEASE ADVISE.

## 2025-02-21 NOTE — TELEPHONE ENCOUNTER
Caller: Waqas Odonnell    Relationship to patient: Self    Best call back number: [phone]    Patient is needing: PATIENT CALLED TO SEE IF WE HAD THESE YET? PLEASE REACH OUT TO ADVISE.

## 2025-02-24 NOTE — TELEPHONE ENCOUNTER
Spoke with pt advised I have tried to get it and nothing ever comes from [facility] but the cover sheet. Advised he needs to reach out to [facility] for the results as I can't seem to get them

## 2025-04-08 ENCOUNTER — TELEPHONE (OUTPATIENT)
Dept: INTERNAL MEDICINE | Facility: CLINIC | Age: 65
End: 2025-04-08
Payer: COMMERCIAL

## 2025-04-08 DIAGNOSIS — Z12.2 SCREENING FOR LUNG CANCER: Primary | ICD-10-CM

## 2025-04-08 NOTE — TELEPHONE ENCOUNTER
Patient called in stating received letter in mail stating patient is due for Lung Cancer Screening    Please Advise

## 2025-04-16 RX ORDER — LEVOTHYROXINE SODIUM 175 MCG
175 TABLET ORAL DAILY
Qty: 90 TABLET | Refills: 1 | Status: SHIPPED | OUTPATIENT
Start: 2025-04-16

## 2025-05-16 ENCOUNTER — HOSPITAL ENCOUNTER (OUTPATIENT)
Dept: CT IMAGING | Facility: HOSPITAL | Age: 65
Discharge: HOME | End: 2025-05-16
Admitting: INTERNAL MEDICINE
Payer: COMMERCIAL

## 2025-05-16 DIAGNOSIS — Z12.2 SCREENING FOR LUNG CANCER: ICD-10-CM

## 2025-05-16 PROCEDURE — 71271 CT THORAX LUNG CANCER SCR C-: CPT

## 2025-05-21 ENCOUNTER — TELEPHONE (OUTPATIENT)
Dept: INTERNAL MEDICINE | Facility: CLINIC | Age: 65
End: 2025-05-21
Payer: COMMERCIAL

## 2025-05-21 DIAGNOSIS — N28.89 RENAL MASS: Primary | ICD-10-CM

## 2025-05-21 NOTE — TELEPHONE ENCOUNTER
PATIENT'S DAUGHTER MARITZA CALLED TO REQUEST RENAL CT SCAN FAXED TO [facility] FAX NUMBER [phone]     IT WILL TAKE A MONTH TO GET HIM IN AT [facility]     CALL BACK NUMB [phone]    THERE IS AN OPENING TOMORROW MORNING AT [facility]. PLEASE FAX AS SOON AS POSSIBLE    HE IS RETIRING NEXT MONTH

## 2025-05-22 ENCOUNTER — TELEPHONE (OUTPATIENT)
Dept: INTERNAL MEDICINE | Facility: CLINIC | Age: 65
End: 2025-05-22
Payer: COMMERCIAL

## 2025-05-22 NOTE — TELEPHONE ENCOUNTER
Caller: Waqas Odonnell    Relationship: Self    Best call back number: [phone]     What orders are you requesting (i.e. lab or imaging): CT ORDERS    In what timeframe would the patient need to come in: ASAP    Where will you receive your lab/imaging services: [facility] OFF [address]    Additional notes: PATIENT SAID [facility] WAS TOO EXPENSIVE; WOULD LIKE TO HAVE IT DONE AT [facility]    PLEASE NOTIFY PATIENT WHEN THIS IS SENT

## 2025-05-23 NOTE — TELEPHONE ENCOUNTER
Caller: Waqas Odonnell    Relationship to patient: Self    Best call back number:   Telephone Information:   Mobile [phone]         Patient is needing: PATIENT SPOKE TO [facility] THIS MORNING AND THEY STILL DO NOT HAVE ORDERS.   PLEASE FAX TO #[phone]    PATIENT STATES THEY DID HAVE TWO OPENINGS TODAY. IF AT ALL POSSIBLE HE WOULD LIKE TO GO TODAY. PLEASE CALL PATIENT ONCE FAXED AGAIN.

## 2025-06-30 ENCOUNTER — OFFICE VISIT (OUTPATIENT)
Dept: INTERNAL MEDICINE | Facility: CLINIC | Age: 65
End: 2025-06-30
Payer: COMMERCIAL

## 2025-06-30 ENCOUNTER — LAB (OUTPATIENT)
Dept: LAB | Facility: HOSPITAL | Age: 65
End: 2025-06-30
Payer: COMMERCIAL

## 2025-06-30 VITALS
HEIGHT: 69 IN | DIASTOLIC BLOOD PRESSURE: 80 MMHG | WEIGHT: 180 LBS | HEART RATE: 78 BPM | OXYGEN SATURATION: 98 % | SYSTOLIC BLOOD PRESSURE: 124 MMHG | TEMPERATURE: 98.5 F | RESPIRATION RATE: 18 BRPM | BODY MASS INDEX: 26.66 KG/M2

## 2025-06-30 DIAGNOSIS — E78.5 HYPERLIPIDEMIA, UNSPECIFIED HYPERLIPIDEMIA TYPE: Primary | Chronic | ICD-10-CM

## 2025-06-30 DIAGNOSIS — R73.02 IGT (IMPAIRED GLUCOSE TOLERANCE): Chronic | ICD-10-CM

## 2025-06-30 DIAGNOSIS — E03.9 HYPOTHYROIDISM, UNSPECIFIED TYPE: Chronic | ICD-10-CM

## 2025-06-30 LAB
GLUCOSE SERPL-MCNC: 114 MG/DL (ref 65–99)
HBA1C MFR BLD: 6 % (ref 4.8–5.6)

## 2025-06-30 PROCEDURE — 83036 HEMOGLOBIN GLYCOSYLATED A1C: CPT | Performed by: INTERNAL MEDICINE

## 2025-06-30 PROCEDURE — 99214 OFFICE O/P EST MOD 30 MIN: CPT | Performed by: INTERNAL MEDICINE

## 2025-06-30 PROCEDURE — 82947 ASSAY GLUCOSE BLOOD QUANT: CPT | Performed by: INTERNAL MEDICINE

## 2025-06-30 PROCEDURE — 36415 COLL VENOUS BLD VENIPUNCTURE: CPT | Performed by: INTERNAL MEDICINE

## 2025-06-30 NOTE — PROGRESS NOTES
Subjective   Waqas Odonnell is a 65 y.o. male.     Chief Complaint   Patient presents with    Hypothyroidism    Hyperlipidemia    Hyperglycemia         Hypothyroidism  Presents for follow-up visit. Symptoms include cold intolerance. Patient reports no constipation, diarrhea or palpitations. His past medical history is significant for hyperlipidemia.   Hyperlipidemia  This is a chronic problem. The current episode started more than 1 year ago. The problem is controlled. Recent lipid tests were reviewed and are normal. Exacerbating diseases include hypothyroidism. There are no known factors aggravating his hyperlipidemia. Pertinent negatives include no chest pain or shortness of breath.   Hyperglycemia  Chronicity:  Chronic  Symptoms: no chest pain    Heartburn  He complains of heartburn. He reports no chest pain or no wheezing. This is a chronic problem. The current episode started more than 1 year ago. The heartburn is of mild intensity. Risk factors include caffeine use. He has tried a histamine-2 antagonist for the symptoms. The treatment provided significant relief.   Anxiety  Presents for follow-up visit.  Patient reports no chest pain, palpitations or shortness of breath.        The following portions of the patient's history were reviewed and updated as appropriate: allergies, current medications, past social history and problem list.    Outpatient Medications Marked as Taking for the 6/30/25 encounter (Office Visit) with Brian Boo MD   Medication Sig Dispense Refill    acyclovir (ZOVIRAX) 400 MG tablet TAKE 1 TABLET TWICE A  tablet 3    pantoprazole (PROTONIX) 40 MG EC tablet Take 1 tablet by mouth 2 (Two) Times a Day. 180 tablet 1    Synthroid 175 MCG tablet TAKE 1 TABLET DAILY 90 tablet 1       Review of Systems   Respiratory:  Negative for shortness of breath and wheezing.    Cardiovascular:  Negative for chest pain, palpitations and leg swelling.   Gastrointestinal:  Positive for heartburn.  Negative for abdominal distention, constipation and diarrhea.   Endocrine: Positive for cold intolerance.       Objective   Vitals:    06/30/25 0829   BP: 124/80   Pulse: 78   Resp: 18   Temp: 98.5 °F (36.9 °C)   SpO2: 98%          06/30/25 0829   Weight: 81.6 kg (180 lb)    [unfilled]  Body mass index is 26.58 kg/m².      Physical Exam  Constitutional:       Appearance: Normal appearance. He is well-developed.   Neck:      Thyroid: No thyromegaly.   Cardiovascular:      Rate and Rhythm: Normal rate and regular rhythm.      Heart sounds: Normal heart sounds. No murmur heard.     No gallop.   Pulmonary:      Effort: Pulmonary effort is normal. No respiratory distress.      Breath sounds: Normal breath sounds. No wheezing or rales.   Abdominal:      General: Bowel sounds are normal.      Palpations: Abdomen is soft. There is no mass.      Tenderness: There is no abdominal tenderness. There is no guarding.   Neurological:      Mental Status: He is alert.              Problems Addressed this Visit          Cardiac and Vasculature    Hyperlipidemia - Primary (Chronic)       Endocrine and Metabolic    IGT (impaired glucose tolerance) (Chronic)    Hypothyroidism (Chronic)     Diagnoses         Codes Comments      Hyperlipidemia, unspecified hyperlipidemia type    -  Primary ICD-10-CM: E78.5  ICD-9-CM: 272.4       IGT (impaired glucose tolerance)     ICD-10-CM: R73.02  ICD-9-CM: 790.22       Hypothyroidism, unspecified type     ICD-10-CM: E03.9  ICD-9-CM: 244.9           Assessment & Plan   In for 6-month recheck of hyperlipidemia, hypothyroidism and GERD today June 2025.  He is now on pantoprazole for his GERD and it is working well.  He is no longer having dysphagia.  Thyroid is under good control.  Annual lab work and annual exam December 2025.  Gets glucose and A1c every 6 months.  He has previously failed to statins due to myalgias.  Had to stop his Zetia for the same reason.  Symptoms cleared up in about 2 weeks.   Currently off of treatment.  He runs a high HDL so it is not likely he really needs to pursue treatment of lipids as aggressively as he has in the past.  He remains on Synthroid 175 MCG daily for his hypothyroidism.  Generally cold intolerant.  He had a CT calcium score in July 2020 which was 21 which was excellent.  Drinks 5-6 beers per day and knows he needs to cut that back regarding his GERD.  He is having left elbow tenderness since he bumped it 14 months ago.  Tender right over that area.  This is lateral epicondylitis.  He will try a tennis elbow splint.  He remains on Protonix 40 mg daily for GERD and Synthroid 175 mcg for hypothyroidism and those were reviewed today.  Continues to take prophylactic Zovirax 400 mg daily.  He is fasting today.      The above information was reviewed again today 06/30/25.  It continues to be accurate as reflected above and is unchanged.  History, physical and review of systems all reviewed and are unchanged.  Medications were reviewed today and continue the current dosing.           Pedro disclaimer:   Much of this encounter note is an electronic transcription/translation of spoken language to printed text. The electronic translation of spoken language may permit erroneous, or at times, nonsensical words or phrases to be inadvertently transcribed; Although I have reviewed the note for such errors, some may still exist.

## 2025-07-28 DIAGNOSIS — M48.061 SPINAL STENOSIS OF LUMBAR REGION, UNSPECIFIED WHETHER NEUROGENIC CLAUDICATION PRESENT: Primary | ICD-10-CM

## 2025-07-28 RX ORDER — PREDNISONE 5 MG/1
TABLET ORAL
Qty: 42 TABLET | Refills: 0 | Status: SHIPPED | OUTPATIENT
Start: 2025-07-28

## 2025-07-28 RX ORDER — CYCLOBENZAPRINE HCL 5 MG
5 TABLET ORAL 3 TIMES DAILY PRN
Qty: 30 TABLET | Refills: 0 | Status: SHIPPED | OUTPATIENT
Start: 2025-07-28

## 2025-08-01 RX ORDER — LEVOTHYROXINE SODIUM 175 UG/1
175 TABLET ORAL DAILY
Qty: 90 TABLET | Refills: 1 | Status: SHIPPED | OUTPATIENT
Start: 2025-08-01

## 2025-08-01 NOTE — TELEPHONE ENCOUNTER
Caller: Waqas Odonnell    Relationship: Self    Best call back number:[phone]      Requested Prescriptions:   Requested Prescriptions     Pending Prescriptions Disp Refills    levothyroxine (Synthroid) 175 MCG tablet 90 tablet 1     Sig: Take 1 tablet by mouth Daily.        Pharmacy where request should be sent: [facility] [id number] [place], [place] - [address] [facility] [address] - [phone]  - [phone] FX     Last office visit with prescribing clinician: 6/30/2025   Last telemedicine visit with prescribing clinician: Visit date not found   Next office visit with prescribing clinician: 1/8/2026     Additional details provided by patient: OUT OF MEDICATION    Does the patient have less than a 3 day supply:  [x] Yes  [] No    Would you like a call back once the refill request has been completed: [] Yes [x] No    If the office needs to give you a call back, can they leave a voicemail: [] Yes [x] No    Irma Weinstein Rep   08/01/25 13:23 EDT

## 2025-08-20 ENCOUNTER — TREATMENT (OUTPATIENT)
Dept: PHYSICAL THERAPY | Facility: CLINIC | Age: 65
End: 2025-08-20
Payer: MEDICARE

## 2025-08-20 DIAGNOSIS — M51.360 DEGENERATION OF INTERVERTEBRAL DISC OF LUMBAR REGION WITH DISCOGENIC BACK PAIN: ICD-10-CM

## 2025-08-20 DIAGNOSIS — M48.061 SPINAL STENOSIS OF LUMBAR REGION, UNSPECIFIED WHETHER NEUROGENIC CLAUDICATION PRESENT: Primary | ICD-10-CM

## 2025-08-20 PROCEDURE — 97530 THERAPEUTIC ACTIVITIES: CPT | Performed by: PHYSICAL THERAPIST

## 2025-08-20 PROCEDURE — 97110 THERAPEUTIC EXERCISES: CPT | Performed by: PHYSICAL THERAPIST

## 2025-08-20 PROCEDURE — 97162 PT EVAL MOD COMPLEX 30 MIN: CPT | Performed by: PHYSICAL THERAPIST
